# Patient Record
Sex: FEMALE | Race: WHITE | NOT HISPANIC OR LATINO | Employment: FULL TIME | URBAN - METROPOLITAN AREA
[De-identification: names, ages, dates, MRNs, and addresses within clinical notes are randomized per-mention and may not be internally consistent; named-entity substitution may affect disease eponyms.]

---

## 2017-01-09 ENCOUNTER — TELEPHONE (OUTPATIENT)
Dept: BARIATRICS/WEIGHT MGMT | Facility: CLINIC | Age: 37
End: 2017-01-09

## 2017-01-09 VITALS
WEIGHT: 235 LBS | SYSTOLIC BLOOD PRESSURE: 127 MMHG | BODY MASS INDEX: 35.61 KG/M2 | TEMPERATURE: 97.4 F | HEART RATE: 65 BPM | DIASTOLIC BLOOD PRESSURE: 84 MMHG | HEIGHT: 68 IN

## 2017-01-09 NOTE — TELEPHONE ENCOUNTER
Patient contacted and she states that she will get a copy of the CD of her most recent UGI at [facility]  and bring it in with her on her next appointment on 1/11/17 4:00 PM.

## 2017-01-09 NOTE — TELEPHONE ENCOUNTER
Patient called and states that she had lap band surgery 5 years ago by Dr. Campuzano and for the past week she has noticed pain and tenderness around her port. Patient complains of pain with her port when she moves, bends over or any type of movement. Patient denies any nausea, vomiting, or other symptoms. Does patient need appointment? Please advise further instructions, thank you.

## 2017-01-10 ENCOUNTER — OFFICE VISIT (OUTPATIENT)
Dept: BARIATRICS/WEIGHT MGMT | Facility: CLINIC | Age: 37
End: 2017-01-10

## 2017-01-10 VITALS
RESPIRATION RATE: 18 BRPM | TEMPERATURE: 98.2 F | HEIGHT: 68 IN | OXYGEN SATURATION: 98 % | WEIGHT: 227 LBS | BODY MASS INDEX: 34.4 KG/M2 | HEART RATE: 75 BPM | DIASTOLIC BLOOD PRESSURE: 72 MMHG | SYSTOLIC BLOOD PRESSURE: 115 MMHG

## 2017-01-10 DIAGNOSIS — R10.9 ABDOMINAL PAIN, UNSPECIFIED LOCATION: Primary | ICD-10-CM

## 2017-01-10 DIAGNOSIS — R10.9 ABDOMINAL PAIN, UNSPECIFIED LOCATION: ICD-10-CM

## 2017-01-10 DIAGNOSIS — E66.9 OBESITY, CLASS I, BMI 30-34.9: ICD-10-CM

## 2017-01-10 DIAGNOSIS — Z98.84 S/P BARIATRIC SURGERY: ICD-10-CM

## 2017-01-10 PROCEDURE — 99214 OFFICE O/P EST MOD 30 MIN: CPT | Performed by: PHYSICIAN ASSISTANT

## 2017-01-10 RX ORDER — LISINOPRIL 20 MG/1
20 TABLET ORAL DAILY
COMMUNITY
End: 2017-02-20

## 2017-01-10 RX ORDER — BUPROPION HYDROCHLORIDE 300 MG/1
300 TABLET ORAL DAILY
COMMUNITY
End: 2018-06-13

## 2017-01-10 RX ORDER — HYDROCHLOROTHIAZIDE 25 MG/1
25 TABLET ORAL DAILY
COMMUNITY
End: 2017-09-09 | Stop reason: HOSPADM

## 2017-01-10 NOTE — PROGRESS NOTES
"[facility] Bariatric Surgery  [address]  [facility] [address] [phone]        Patient Name:  Lindsey Sprague.  :  1980      Date of Visit: 1/10/2017    Bariatric Surgery Follow up visit    HPI:  Lindsey Sprague is a 36 y.o. female s/p LAGB APS w/ HHR 3/2011 by [facility].  Band was emptied (-6.9cc) 2015 for c/o dysphagia.  UGI following unfill WNL.  Unfortunately gained 34 lbs w/ the unfill and was very frustrated w/ herself.  LOV 2015 - added 0.2cc.  Current band vol 6.8cc.  Called yesterday c/o port pain/tenderness x 1 week, worse w/ ROM.  Denies dysphagia, reflux, nausea, vomiting.  Says she feels her band is not the issue.  Denies fevers or redness at the port site.  Does not think she can tolerate any type of adjustment today d/t the pain.       The following portions of the patient's history were reviewed and updated as appropriate: allergies, current medications, past medical history, past social history and past surgical history.    Past Medical History   Diagnosis Date   • Depression    • Fatigue    • GERD (gastroesophageal reflux disease)    • Hypertension    • Polycystic ovarian syndrome      Past Surgical History   Procedure Laterality Date   • Tonsillectomy     • Laparoscopic gastric banding       s/p LAGB APS w/ HHR 3/2011 by [facility]     Allergies no known allergies      Current Outpatient Prescriptions:   •  buPROPion XL (WELLBUTRIN XL) 300 MG 24 hr tablet, Take 300 mg by mouth Daily., Disp: , Rfl:   •  hydrochlorothiazide (HYDRODIURIL) 25 MG tablet, Take 25 mg by mouth Daily., Disp: , Rfl:   •  lisinopril (PRINIVIL,ZESTRIL) 20 MG tablet, Take 20 mg by mouth Daily., Disp: , Rfl:       Visit Vitals   • /72 (BP Location: Left arm, Patient Position: Sitting, Cuff Size: Large Adult)   • Pulse 75   • Temp 98.2 °F (36.8 °C) (Temporal Artery )   • Resp 18   • Ht 68\" (172.7 cm)   • Wt 227 lb (103 kg)  Comment: pre-op weight 266 lbs   • SpO2 98%   • " BMI 34.52 kg/m2       Physical Exam   Constitutional: She appears well-developed and well-nourished. She is cooperative.   obese   HENT:   Mouth/Throat: Oropharynx is clear and moist and mucous membranes are normal.   Eyes: Conjunctivae are normal. No scleral icterus.   Cardiovascular: Normal rate and regular rhythm.    Pulmonary/Chest: Effort normal and breath sounds normal.   Abdominal: Soft. Bowel sounds are normal.   LLQ port site - tender to palpation but o/w okay   Musculoskeletal: Normal range of motion. She exhibits no edema.   Neurological: She is alert.   Skin: Skin is warm and dry. No rash noted.   Psychiatric: She has a normal mood and affect. Judgment normal.     Procedure Note:  Band Adjustment    Band Type:   APS   Port Location:   LLQ   Procedure Position:   upright   Needle Type:  short    Local Anesthesia: with 1% lidocaine and bicarb       Amount Expected (cc):    6.8   Amount Added (cc):    Amount Removed (cc):     Total Amount (cc):           Able to andrea water after ADJ? Yes   Other Notes:        Assessment: s/p LAGB APS w/ HHR 3/2011 by [facility]    ICD-10-CM ICD-9-CM   1. Abdominal pain, unspecified location R10.9 789.00   2. Obesity, Class I, BMI 30-34.9 E66.9 278.00   3. S/P bariatric surgery Z98.84 V45.86       Plan:    • No adjustment today.  • UGI ordered to further evaluate.  • Discussed EGD to rule out band erosion.   Discussed possibility of band intolerance which would ultimately lead to band removal.  Patient expressed interest in pursuing a revision to LSG - packet given to patient.  Further input to follow pending results.        JEAN Miles

## 2017-01-12 ENCOUNTER — TELEPHONE (OUTPATIENT)
Dept: BARIATRICS/WEIGHT MGMT | Facility: CLINIC | Age: 37
End: 2017-01-12

## 2017-01-12 ENCOUNTER — RESULTS ENCOUNTER (OUTPATIENT)
Dept: BARIATRICS/WEIGHT MGMT | Facility: CLINIC | Age: 37
End: 2017-01-12

## 2017-01-12 DIAGNOSIS — R10.9 ABDOMINAL PAIN, UNSPECIFIED LOCATION: Primary | ICD-10-CM

## 2017-01-12 DIAGNOSIS — R10.9 ABDOMINAL PAIN, UNSPECIFIED LOCATION: ICD-10-CM

## 2017-01-12 NOTE — TELEPHONE ENCOUNTER
Patient informed that she can get a copy of the UGI images on a CD and bring them today or have her  bring them tomorrow, whichever she prefers. Patient verbalized understanding.

## 2017-01-12 NOTE — TELEPHONE ENCOUNTER
Patient contacted and states that she had her blood drawn this morning with the procedure. Patient wants to know if you want her to bring you the results today. Patient states she spoke with you last night.

## 2017-01-13 ENCOUNTER — OFFICE VISIT (OUTPATIENT)
Dept: BARIATRICS/WEIGHT MGMT | Facility: CLINIC | Age: 37
End: 2017-01-13

## 2017-01-13 ENCOUNTER — TELEPHONE (OUTPATIENT)
Dept: BARIATRICS/WEIGHT MGMT | Facility: CLINIC | Age: 37
End: 2017-01-13

## 2017-01-13 VITALS
DIASTOLIC BLOOD PRESSURE: 81 MMHG | HEIGHT: 68 IN | SYSTOLIC BLOOD PRESSURE: 128 MMHG | WEIGHT: 230.55 LBS | HEART RATE: 72 BPM | BODY MASS INDEX: 34.94 KG/M2 | TEMPERATURE: 98.7 F

## 2017-01-13 DIAGNOSIS — R11.0 NAUSEA: ICD-10-CM

## 2017-01-13 DIAGNOSIS — Z98.84 S/P BARIATRIC SURGERY: ICD-10-CM

## 2017-01-13 DIAGNOSIS — E66.9 OBESITY, CLASS II, BMI 35-39.9: ICD-10-CM

## 2017-01-13 DIAGNOSIS — R10.9 ABDOMINAL PAIN, UNSPECIFIED LOCATION: Primary | ICD-10-CM

## 2017-01-13 PROCEDURE — S2083 ADJUSTMENT GASTRIC BAND: HCPCS | Performed by: PHYSICIAN ASSISTANT

## 2017-01-13 PROCEDURE — 99214 OFFICE O/P EST MOD 30 MIN: CPT | Performed by: PHYSICIAN ASSISTANT

## 2017-01-13 NOTE — TELEPHONE ENCOUNTER
"Patient called and complains of worsening nausea and a \"burning\" sensation in her stomach. Patient states that her port site is still swollen and sore. Patient denies any other symptoms. Patient wants to know what further instructions you have for her and if you have reviewed her results? Thank you  "

## 2017-01-13 NOTE — TELEPHONE ENCOUNTER
I called the patient and she states that she will come in today, patient is on the way and will be here at 3:45PM. Patient wants to discuss having the band removal at her appointment.

## 2017-01-13 NOTE — PROGRESS NOTES
[facility] Bariatric Surgery  [address]  [facility] [address]  [phone]        Patient Name:  Lindsey Sprague.  :  1980      Date of Visit: 2017    Bariatric Surgery Follow up visit    HPI:  Lindsey Sprague is a 36 y.o. female s/p LAGB APS w/ HHR 3/2011 by [facility].  Presented 1/10/17 with c/o port pain/tenderness x 1 week, worse w/ ROM.  Denied dysphagia, reflux, nausea, vomiting.  At that time felt her band was not the issue, and she said she did not think she could tolerate an unfill.  Denied fevers or redness at the port site.  No known trauma to the area.      Labs and UGI ordered for further evaluation.  CBC/CMP 17 WNL.  UGI @[facility] 17 showed mild pouch enlargement, but was otherwise unremarkable.      She returns to the office today in tears b/c her pain is worsening.  Now c/o associated nausea and burning epigastric pain, as well as persistent/worsening port pain.  Still no fevers/chills or redness at the port site.  Agrees to band unfill and says she wants to pursue band removal.    The following portions of the patient's history were reviewed and updated as appropriate: allergies, current medications, past medical history, past social history and past surgical history.    Past Medical History   Diagnosis Date   • Depression    • Fatigue    • GERD (gastroesophageal reflux disease)    • Hypertension    • Polycystic ovarian syndrome      Past Surgical History   Procedure Laterality Date   • Tonsillectomy     • Laparoscopic gastric banding       s/p LAGB APS w/ HHR 3/2011 by [facility]     No Known Allergies      Current Outpatient Prescriptions:   •  buPROPion XL (WELLBUTRIN XL) 300 MG 24 hr tablet, Take 300 mg by mouth Daily., Disp: , Rfl:   •  hydrochlorothiazide (HYDRODIURIL) 25 MG tablet, Take 25 mg by mouth Daily., Disp: , Rfl:   •  lisinopril (PRINIVIL,ZESTRIL) 20 MG tablet, Take 20 mg by mouth Daily., Disp: , Rfl:      Social Hx:  ", Lives in [place], Nonsmoker.      Visit Vitals   • /81 (BP Location: Left arm, Patient Position: Sitting)   • Pulse 72   • Temp 98.7 °F (37.1 °C)   • Ht 68\" (172.7 cm)   • Wt 230 lb 8.8 oz (105 kg)   • BMI 35.06 kg/m2       Physical Exam   Constitutional: She appears well-developed and well-nourished. She is cooperative.   appears distressed and uncomfortable   HENT:   Mouth/Throat: Oropharynx is clear and moist and mucous membranes are normal.   Eyes: Conjunctivae are normal. No scleral icterus.   Cardiovascular: Normal rate and regular rhythm.    Pulmonary/Chest: Effort normal and breath sounds normal.   Abdominal: Soft. Bowel sounds are normal.   LLQ port site - tender to palpation but w/out warmth/erythema   Musculoskeletal: Normal range of motion. She exhibits no edema.   Neurological: She is alert.   Skin: Skin is warm and dry. No rash noted. There is pallor.   Psychiatric: She has a normal mood and affect. Judgment normal.     Procedure Note:  Band Adjustment    Band Type:   APS   Port Location:   LLQ   Procedure Position:   upright   Needle Type:  short    Local Anesthesia: with 1% lidocaine and bicarb       Amount Expected (cc):    6.8   Amount Added (cc):    Amount Removed (cc):  -6.8cc (clear fluid)   Total Amount (cc):    0.0       Able to andrea water after ADJ? Yes   Other Notes:        Assessment: s/p LAGB APS w/ HHR 3/2011 by [facility]    ICD-10-CM ICD-9-CM   1. Abdominal pain, unspecified location R10.9 789.00   2. Nausea R11.0 787.02   3. Obesity, Class II, BMI 35-39.9 E66.01 278.01   4. S/P bariatric surgery Z98.84 V45.86       Plan:  Band unfilled.  Advised warm liquids w/ gradual diet progression only as tolerated.   Plan EGD for further eval.  Instructed to go to ER if pain acutely worsens.  Patient verbalizes understanding.          JEAN Miles  "

## 2017-01-23 PROCEDURE — 88305 TISSUE EXAM BY PATHOLOGIST: CPT | Performed by: SURGERY

## 2017-01-24 ENCOUNTER — TELEPHONE (OUTPATIENT)
Dept: BARIATRICS/WEIGHT MGMT | Facility: CLINIC | Age: 37
End: 2017-01-24

## 2017-01-24 ENCOUNTER — LAB REQUISITION (OUTPATIENT)
Dept: LAB | Facility: HOSPITAL | Age: 37
End: 2017-01-24

## 2017-01-24 DIAGNOSIS — R10.11 RIGHT UPPER QUADRANT PAIN: ICD-10-CM

## 2017-01-24 NOTE — TELEPHONE ENCOUNTER
"Patient called and states that she had her EGD completed yesterday. Patient is wanting to know the next step in the process about getting her band removed because she is still in a lot of pain. Patient states that Dr. Campuzano told her that she is a good candidate for the Sleeve surgery and she wants to get the \"ball rolling\" on this. Patient is also wanting to know the result to her EGD biopsy.   "

## 2017-01-25 ENCOUNTER — PREP FOR SURGERY (OUTPATIENT)
Dept: BARIATRICS/WEIGHT MGMT | Facility: CLINIC | Age: 37
End: 2017-01-25

## 2017-01-25 DIAGNOSIS — R10.9 ABDOMINAL PAIN, UNSPECIFIED LOCATION: Primary | ICD-10-CM

## 2017-01-25 LAB
CYTO UR: NORMAL
LAB AP CASE REPORT: NORMAL
LAB AP CLINICAL INFORMATION: NORMAL
Lab: NORMAL
PATH REPORT.FINAL DX SPEC: NORMAL
PATH REPORT.GROSS SPEC: NORMAL

## 2017-01-25 NOTE — TELEPHONE ENCOUNTER
Patient contacted and informed that the order has been placed for band removal and awaiting for insurance approval. Patient states that she has submitted the revision packet.

## 2017-01-31 DIAGNOSIS — R53.83 FATIGUE, UNSPECIFIED TYPE: Primary | ICD-10-CM

## 2017-01-31 DIAGNOSIS — R06.00 DYSPNEA, UNSPECIFIED TYPE: ICD-10-CM

## 2017-01-31 NOTE — H&P
[facility] Bariatric Surgery  [address]  [facility] [address]  [phone]           Patient Name: Lindsey Sprague.  : 1980    CC:  Chronic abd.pain, s/p lapband     HPI: Lindsey Sprague is a 36 y.o. female s/p LAGB APS w/ HHR 3/2011 by [facility]. Presented 1/10/17 with c/o port pain/tenderness x 1 week, worse w/ ROM. Denied dysphagia, reflux, nausea, vomiting. At that time felt her band was not the issue, and she said she did not think she could tolerate an unfill. Denied fevers or redness at the port site. No known trauma to the area.      Labs and UGI ordered for further evaluation. CBC/CMP 17 WNL. [facility] 17 showed mild pouch enlargement, but was otherwise unremarkable.      Returned to office 17 for band unfill.  Band emptied (-6.8cc clear fluid removed).  Sx unfortunately persisted.   EGD 17 w/ Dr. Campuzano revealed moderate pouch enlargment but no slip or band erosion.    She continues to c/o nausea and burning epigastric pain, as well as persistent/worsening port pain. Still no fevers/chills or redness at the port site.  Wishes to pursue band removal at this time.     Medical History         Past Medical History   Diagnosis Date   • Depression     • Fatigue     • GERD (gastroesophageal reflux disease)     • Hypertension     • Polycystic ovarian syndrome            Surgical History           Past Surgical History   Procedure Laterality Date   • Tonsillectomy      • Laparoscopic gastric banding          s/p LAGB APS w/ HHR 3/2011 by [facility]         No Known Allergies        Current Outpatient Prescriptions:   • buPROPion XL (WELLBUTRIN XL) 300 MG 24 hr tablet, Take 300 mg by mouth Daily., Disp: , Rfl:   • hydrochlorothiazide (HYDRODIURIL) 25 MG tablet, Take 25 mg by mouth Daily., Disp: , Rfl:   • lisinopril (PRINIVIL,ZESTRIL) 20 MG tablet, Take 20 mg by mouth Daily., Disp: , Rfl:       Social Hx: , Lives in [place],  "Nonsmoker.             Visit Vitals   • /81 (BP Location: Left arm, Patient Position: Sitting)   • Pulse 72   • Temp 98.7 °F (37.1 °C)   • Ht 68\" (172.7 cm)   • Wt 230 lb 8.8 oz (105 kg)   • BMI 35.06 kg/m2         Physical Exam   Constitutional: She appears well-developed and well-nourished. She is cooperative.   appears distressed and uncomfortable   HENT:   Mouth/Throat: Oropharynx is clear and moist and mucous membranes are normal.   Eyes: Conjunctivae are normal. No scleral icterus.   Cardiovascular: Normal rate and regular rhythm.   Pulmonary/Chest: Effort normal and breath sounds normal.   Abdominal: Soft. Bowel sounds are normal.   LLQ port site - tender to palpation but w/out warmth/erythema   Musculoskeletal: Normal range of motion. She exhibits no edema.   Neurological: She is alert.   Skin: Skin is warm and dry. No rash noted. There is pallor.   Psychiatric: She has a normal mood and affect. Judgment normal.            Band Adjustment    Band Type: APS   Port Location: LLQ   Procedure Position: upright   Needle Type: short    Local Anesthesia: with 1% lidocaine and bicarb         Amount Expected (cc): 0.0   Amount Added (cc):     Amount Removed (cc):    Total Amount (cc): 0.0         Able to andrea water after ADJ? Yes   Other Notes:           Assessment: s/p LAGB APS w/ HHR 3/2011 by [facility]      ICD-10-CM ICD-9-CM   1. Abdominal pain, unspecified location R10.9 789.00   2. Nausea R11.0 787.02   3. Obesity, Class II, BMI 35-39.9 E66.01 278.01   4. S/P bariatric surgery Z98.84 V45.86         Plan:   Laparoscopic Lapband Removal.        "

## 2017-02-01 DIAGNOSIS — R06.00 DYSPNEA, UNSPECIFIED TYPE: ICD-10-CM

## 2017-02-01 DIAGNOSIS — R53.83 FATIGUE, UNSPECIFIED TYPE: ICD-10-CM

## 2017-02-06 ENCOUNTER — LAB REQUISITION (OUTPATIENT)
Dept: LAB | Facility: HOSPITAL | Age: 37
End: 2017-02-06

## 2017-02-06 ENCOUNTER — OUTSIDE FACILITY SERVICE (OUTPATIENT)
Dept: BARIATRICS/WEIGHT MGMT | Facility: CLINIC | Age: 37
End: 2017-02-06

## 2017-02-06 DIAGNOSIS — R10.9 ABDOMINAL PAIN: ICD-10-CM

## 2017-02-06 LAB
LAB AP CASE REPORT: NORMAL
LAB AP CLINICAL INFORMATION: NORMAL
Lab: NORMAL
PATH REPORT.FINAL DX SPEC: NORMAL
PATH REPORT.GROSS SPEC: NORMAL

## 2017-02-06 PROCEDURE — 43774 LAP RMVL GASTR ADJ ALL PARTS: CPT | Performed by: SURGERY

## 2017-02-06 PROCEDURE — 88300 SURGICAL PATH GROSS: CPT | Performed by: SURGERY

## 2017-02-07 ENCOUNTER — TELEPHONE (OUTPATIENT)
Dept: BARIATRICS/WEIGHT MGMT | Facility: CLINIC | Age: 37
End: 2017-02-07

## 2017-02-07 RX ORDER — ONDANSETRON 4 MG/1
4 TABLET, FILM COATED ORAL EVERY 6 HOURS PRN
Qty: 20 TABLET | Refills: 0 | Status: SHIPPED | OUTPATIENT
Start: 2017-02-07 | End: 2017-05-18

## 2017-02-07 NOTE — TELEPHONE ENCOUNTER
Patient called requesting RX for Zofran, patient complains the pain medication is making her sick to her stomach but she states she has to take the pain medication. Patient also states that she has a follow up appointment on 2/20/17 and she wants to know if she can schedule her follow up and her revision appointment on the same day?

## 2017-02-07 NOTE — TELEPHONE ENCOUNTER
Patient contacted and informed that RX Zofran was sent to the pharmacy. I also informed patient that she would have a revision/intake appointment on a later date.

## 2017-02-20 ENCOUNTER — OFFICE VISIT (OUTPATIENT)
Dept: BARIATRICS/WEIGHT MGMT | Facility: CLINIC | Age: 37
End: 2017-02-20

## 2017-02-20 VITALS
DIASTOLIC BLOOD PRESSURE: 88 MMHG | HEART RATE: 87 BPM | WEIGHT: 245 LBS | SYSTOLIC BLOOD PRESSURE: 128 MMHG | HEIGHT: 68 IN | OXYGEN SATURATION: 99 % | RESPIRATION RATE: 18 BRPM | TEMPERATURE: 98.1 F | BODY MASS INDEX: 37.13 KG/M2

## 2017-02-20 DIAGNOSIS — Z98.890 STATUS POST SURGERY: Primary | ICD-10-CM

## 2017-02-20 DIAGNOSIS — R53.83 OTHER FATIGUE: ICD-10-CM

## 2017-02-20 PROCEDURE — 99024 POSTOP FOLLOW-UP VISIT: CPT | Performed by: PHYSICIAN ASSISTANT

## 2017-02-20 RX ORDER — FLUOXETINE HYDROCHLORIDE 40 MG/1
40 CAPSULE ORAL DAILY
COMMUNITY
Start: 2017-01-19 | End: 2018-03-16

## 2017-02-20 RX ORDER — LOSARTAN POTASSIUM 25 MG/1
25 TABLET ORAL DAILY
COMMUNITY
Start: 2017-02-18 | End: 2018-03-16

## 2017-02-20 NOTE — PROGRESS NOTES
[facility] BARIATRIC SURGERY  [address]  [facility] [address]  [phone]    Lindsey Sprague.  1980    Day Of Visit: 2/20/17    Reason for Visit:  Band removal Follow Up    HPI:   Lindsey Sprague is a 36 y.o. female s/p LAGB APS w/ HHR 3/2011 followed by [facility] by Dr. Campuzano  on 2/6/17 for port pain, pouch enlargement and nausea/epigastric pain. Doing well now. No further issues. Tolerating PO w/out issue. Denies fever, nausea, vomiting and abdominal pain. Bowels are moving. Voiding well. No other issues/concerns.     Past Medical History   Diagnosis Date   • Depression    • Fatigue    • GERD (gastroesophageal reflux disease)    • Heartburn    • Hypertension    • Menstrual irregularity    • Polycystic ovarian syndrome      Past Surgical History   Procedure Laterality Date   • Laparoscopic gastric banding  2011     s/p LAGB APS w/ HHR 3/2011 by [facility]   • Tonsillectomy  1994       Current Outpatient Prescriptions:   •  buPROPion XL (WELLBUTRIN XL) 300 MG 24 hr tablet, Take 300 mg by mouth Daily., Disp: , Rfl:   •  hydrochlorothiazide (HYDRODIURIL) 25 MG tablet, Take 25 mg by mouth Daily., Disp: , Rfl:   •  lisinopril (PRINIVIL,ZESTRIL) 20 MG tablet, Take 20 mg by mouth Daily., Disp: , Rfl:   •  ondansetron (ZOFRAN) 4 MG tablet, Take 1 tablet by mouth Every 6 (Six) Hours As Needed for nausea., Disp: 20 tablet, Rfl: 0  No Known Allergies  Social History     Social History   • Marital status:      Spouse name: N/A   • Number of children: N/A   • Years of education: N/A     Occupational History   • Not on file.     Social History Main Topics   • Smoking status: Never Smoker   • Smokeless tobacco: Not on file   • Alcohol use No   • Drug use: Not on file   • Sexual activity: Not on file     Other Topics Concern   • Not on file     Social History Narrative     Visit Vitals   • /88 (BP Location: Left arm, Patient Position: Sitting, Cuff Size: Large Adult)   • Pulse 87  "  • Temp 98.1 °F (36.7 °C) (Temporal Artery )   • Resp 18   • Ht 67.5\" (171.5 cm)   • Wt 245 lb (111 kg)   • SpO2 99%   • BMI 37.81 kg/m2     General Appearance:  Well nourished.  In no acute distress.  Patient was observed to be obese.  Oral Cavity:   Buccal Mucosa: The buccal mucosa was moist.  Lungs:  Normal breath sounds/voice sounds.  Cardiovascular:   Heart Rate And Rhythm: Heart rate and rhythm normal.   Edema: No calf tenderness.  Abdomen:  Abdomen: incisions healing well.   Auscultation: The bowel sounds were normal.   Palpation: No mass was palpated in the abdomen, Soft, nontender, and nondistended.   Hernia: No hernia was discovered.  Musculoskeletal System:   General/bilateral: No edema present in extremities.  Normal movement of all extremities.  Neurological:  Was alert and oriented.   Gait And Stance: Gait and stance were normal.  Psychiatric:   Attitude: The attitude was cooperative.   Mood: Mood pleasant.  Skin:  The complexion was normal.  The skin moisture was normal and the skin temperature was normal.    Assessment:   2 weeks s/p AGBR;  the patient is doing well.       Plan:   Call w/issues and concerns  Follow up 3 months for sleeve consult.  RTC sooner with problems.    JEAN Camacho    "

## 2017-05-04 ENCOUNTER — DOCUMENTATION (OUTPATIENT)
Dept: BARIATRICS/WEIGHT MGMT | Facility: CLINIC | Age: 37
End: 2017-05-04

## 2017-05-04 DIAGNOSIS — R53.83 FATIGUE, UNSPECIFIED TYPE: Primary | ICD-10-CM

## 2017-05-04 DIAGNOSIS — R06.00 DYSPNEA, UNSPECIFIED TYPE: ICD-10-CM

## 2017-05-04 DIAGNOSIS — R10.13 DYSPEPSIA: ICD-10-CM

## 2017-05-17 DIAGNOSIS — R53.83 FATIGUE, UNSPECIFIED TYPE: ICD-10-CM

## 2017-05-17 DIAGNOSIS — R06.00 DYSPNEA, UNSPECIFIED TYPE: ICD-10-CM

## 2017-05-17 DIAGNOSIS — R10.13 DYSPEPSIA: ICD-10-CM

## 2017-05-18 ENCOUNTER — DOCUMENTATION (OUTPATIENT)
Dept: BARIATRICS/WEIGHT MGMT | Facility: HOSPITAL | Age: 37
End: 2017-05-18

## 2017-05-18 ENCOUNTER — OFFICE VISIT (OUTPATIENT)
Dept: BARIATRICS/WEIGHT MGMT | Facility: CLINIC | Age: 37
End: 2017-05-18

## 2017-05-18 VITALS
HEART RATE: 88 BPM | RESPIRATION RATE: 18 BRPM | BODY MASS INDEX: 40.01 KG/M2 | DIASTOLIC BLOOD PRESSURE: 68 MMHG | WEIGHT: 264 LBS | SYSTOLIC BLOOD PRESSURE: 108 MMHG | TEMPERATURE: 97.8 F | HEIGHT: 68 IN | OXYGEN SATURATION: 99 %

## 2017-05-18 DIAGNOSIS — F32.A DEPRESSION, UNSPECIFIED DEPRESSION TYPE: ICD-10-CM

## 2017-05-18 DIAGNOSIS — R53.83 OTHER FATIGUE: Primary | ICD-10-CM

## 2017-05-18 DIAGNOSIS — E66.01 MORBID OBESITY, UNSPECIFIED OBESITY TYPE (HCC): ICD-10-CM

## 2017-05-18 DIAGNOSIS — I10 ESSENTIAL HYPERTENSION: ICD-10-CM

## 2017-05-18 PROCEDURE — 99215 OFFICE O/P EST HI 40 MIN: CPT | Performed by: PHYSICIAN ASSISTANT

## 2017-05-18 RX ORDER — TRAZODONE HYDROCHLORIDE 100 MG/1
100 TABLET ORAL NIGHTLY
COMMUNITY
Start: 2017-05-15

## 2017-05-18 RX ORDER — BUSPIRONE HYDROCHLORIDE 10 MG/1
10 TABLET ORAL AS NEEDED
COMMUNITY
Start: 2017-03-22 | End: 2018-06-13

## 2017-05-22 LAB
ALBUMIN SERPL-MCNC: 4.2 G/DL (ref 3.2–4.8)
ALBUMIN/GLOB SERPL: 1.4 G/DL (ref 1.5–2.5)
ALP SERPL-CCNC: 63 U/L (ref 25–100)
ALT SERPL-CCNC: 27 U/L (ref 7–40)
AST SERPL-CCNC: 25 U/L (ref 0–33)
BILIRUB SERPL-MCNC: 0.4 MG/DL (ref 0.3–1.2)
BUN SERPL-MCNC: 15 MG/DL (ref 9–23)
BUN/CREAT SERPL: 16.7 (ref 7–25)
CALCIUM SERPL-MCNC: 9.7 MG/DL (ref 8.7–10.4)
CHLORIDE SERPL-SCNC: 103 MMOL/L (ref 99–109)
CHOLEST SERPL-MCNC: 192 MG/DL (ref 0–200)
CO2 SERPL-SCNC: 30 MMOL/L (ref 20–31)
CREAT SERPL-MCNC: 0.9 MG/DL (ref 0.6–1.3)
ERYTHROCYTE [DISTWIDTH] IN BLOOD BY AUTOMATED COUNT: 13.5 % (ref 11.3–14.5)
GLOBULIN SER CALC-MCNC: 3 GM/DL
GLUCOSE SERPL-MCNC: 101 MG/DL (ref 70–100)
H PYLORI IGA SER-ACNC: <9 UNITS (ref 0–8.9)
H PYLORI IGG SER IA-ACNC: <0.9 U/ML (ref 0–0.8)
H PYLORI IGM SER-ACNC: <9 UNITS (ref 0–8.9)
HCT VFR BLD AUTO: 45 % (ref 34.5–44)
HDLC SERPL-MCNC: 81 MG/DL (ref 40–60)
HGB BLD-MCNC: 14.7 G/DL (ref 11.5–15.5)
LDLC SERPL CALC-MCNC: 102 MG/DL (ref 0–100)
MCH RBC QN AUTO: 27.3 PG (ref 27–31)
MCHC RBC AUTO-ENTMCNC: 32.7 G/DL (ref 32–36)
MCV RBC AUTO: 83.5 FL (ref 80–99)
PLATELET # BLD AUTO: 289 10*3/MM3 (ref 150–450)
POTASSIUM SERPL-SCNC: 3.7 MMOL/L (ref 3.5–5.5)
PROT SERPL-MCNC: 7.2 G/DL (ref 5.7–8.2)
RBC # BLD AUTO: 5.39 10*6/MM3 (ref 3.89–5.14)
SODIUM SERPL-SCNC: 138 MMOL/L (ref 132–146)
TRIGL SERPL-MCNC: 44 MG/DL (ref 0–150)
TSH SERPL DL<=0.005 MIU/L-ACNC: 1.69 MIU/ML (ref 0.35–5.35)
VLDLC SERPL CALC-MCNC: 8.8 MG/DL
WBC # BLD AUTO: 6.82 10*3/MM3 (ref 3.5–10.8)

## 2017-08-14 DIAGNOSIS — R53.83 FATIGUE, UNSPECIFIED TYPE: ICD-10-CM

## 2017-08-14 DIAGNOSIS — R06.00 DYSPNEA, UNSPECIFIED TYPE: Primary | ICD-10-CM

## 2017-08-14 DIAGNOSIS — R06.00 DYSPNEA, UNSPECIFIED TYPE: ICD-10-CM

## 2017-08-22 ENCOUNTER — CONSULT (OUTPATIENT)
Dept: BARIATRICS/WEIGHT MGMT | Facility: CLINIC | Age: 37
End: 2017-08-22

## 2017-08-22 VITALS
BODY MASS INDEX: 41.6 KG/M2 | OXYGEN SATURATION: 99 % | WEIGHT: 274.5 LBS | RESPIRATION RATE: 16 BRPM | HEIGHT: 68 IN | SYSTOLIC BLOOD PRESSURE: 125 MMHG | TEMPERATURE: 97.5 F | DIASTOLIC BLOOD PRESSURE: 80 MMHG | HEART RATE: 77 BPM

## 2017-08-22 DIAGNOSIS — E66.01 OBESITY, CLASS III, BMI 40-49.9 (MORBID OBESITY) (HCC): Primary | ICD-10-CM

## 2017-08-22 PROCEDURE — 99407 BEHAV CHNG SMOKING > 10 MIN: CPT | Performed by: SURGERY

## 2017-08-22 PROCEDURE — 99214 OFFICE O/P EST MOD 30 MIN: CPT | Performed by: SURGERY

## 2017-08-22 RX ORDER — CHLORHEXIDINE GLUCONATE 0.12 MG/ML
15 RINSE ORAL ONCE
Status: CANCELLED | OUTPATIENT
Start: 2017-08-22

## 2017-08-22 RX ORDER — SODIUM CHLORIDE, SODIUM LACTATE, POTASSIUM CHLORIDE, CALCIUM CHLORIDE 600; 310; 30; 20 MG/100ML; MG/100ML; MG/100ML; MG/100ML
150 INJECTION, SOLUTION INTRAVENOUS CONTINUOUS
Status: CANCELLED | OUTPATIENT
Start: 2017-08-22

## 2017-08-22 RX ORDER — SCOLOPAMINE TRANSDERMAL SYSTEM 1 MG/1
1 PATCH, EXTENDED RELEASE TRANSDERMAL ONCE
Status: CANCELLED | OUTPATIENT
Start: 2017-08-22 | End: 2017-08-22

## 2017-08-22 RX ORDER — SODIUM CHLORIDE 0.9 % (FLUSH) 0.9 %
1-10 SYRINGE (ML) INJECTION AS NEEDED
Status: CANCELLED | OUTPATIENT
Start: 2017-08-22

## 2017-08-22 RX ORDER — PANTOPRAZOLE SODIUM 40 MG/10ML
40 INJECTION, POWDER, LYOPHILIZED, FOR SOLUTION INTRAVENOUS ONCE
Status: CANCELLED | OUTPATIENT
Start: 2017-08-22 | End: 2017-08-22

## 2017-08-23 PROBLEM — E66.01 OBESITY, CLASS III, BMI 40-49.9 (MORBID OBESITY) (HCC): Status: ACTIVE | Noted: 2017-08-23

## 2017-08-30 ENCOUNTER — APPOINTMENT (OUTPATIENT)
Dept: PREADMISSION TESTING | Facility: HOSPITAL | Age: 37
End: 2017-08-30

## 2017-08-30 LAB
DEPRECATED RDW RBC AUTO: 40.6 FL (ref 37–54)
ERYTHROCYTE [DISTWIDTH] IN BLOOD BY AUTOMATED COUNT: 13.5 % (ref 11.3–14.5)
HBA1C MFR BLD: 5.1 % (ref 4.8–5.6)
HCT VFR BLD AUTO: 42.3 % (ref 34.5–44)
HGB BLD-MCNC: 14.4 G/DL (ref 11.5–15.5)
MCH RBC QN AUTO: 27.9 PG (ref 27–31)
MCHC RBC AUTO-ENTMCNC: 34 G/DL (ref 32–36)
MCV RBC AUTO: 82 FL (ref 80–99)
PLATELET # BLD AUTO: 241 10*3/MM3 (ref 150–450)
PMV BLD AUTO: 9.1 FL (ref 6–12)
POTASSIUM BLD-SCNC: 3.4 MMOL/L (ref 3.5–5.5)
RBC # BLD AUTO: 5.16 10*6/MM3 (ref 3.89–5.14)
WBC NRBC COR # BLD: 10.37 10*3/MM3 (ref 3.5–10.8)

## 2017-08-30 PROCEDURE — 85027 COMPLETE CBC AUTOMATED: CPT | Performed by: ANESTHESIOLOGY

## 2017-08-30 PROCEDURE — 36415 COLL VENOUS BLD VENIPUNCTURE: CPT

## 2017-08-30 PROCEDURE — 83036 HEMOGLOBIN GLYCOSYLATED A1C: CPT | Performed by: ANESTHESIOLOGY

## 2017-08-30 PROCEDURE — 84132 ASSAY OF SERUM POTASSIUM: CPT | Performed by: ANESTHESIOLOGY

## 2017-09-08 ENCOUNTER — HOSPITAL ENCOUNTER (INPATIENT)
Facility: HOSPITAL | Age: 37
LOS: 1 days | Discharge: HOME | End: 2017-09-09
Attending: SURGERY | Admitting: SURGERY

## 2017-09-08 ENCOUNTER — ANESTHESIA EVENT (OUTPATIENT)
Dept: PERIOP | Facility: HOSPITAL | Age: 37
End: 2017-09-08

## 2017-09-08 ENCOUNTER — ANESTHESIA (OUTPATIENT)
Dept: PERIOP | Facility: HOSPITAL | Age: 37
End: 2017-09-08

## 2017-09-08 DIAGNOSIS — E66.01 OBESITY, CLASS III, BMI 40-49.9 (MORBID OBESITY) (HCC): ICD-10-CM

## 2017-09-08 LAB
B-HCG UR QL: NEGATIVE
INTERNAL NEGATIVE CONTROL: NORMAL
INTERNAL POSITIVE CONTROL: REACTIVE
Lab: NORMAL
POTASSIUM BLDA-SCNC: 4.78 MMOL/L (ref 3.5–5.3)

## 2017-09-08 PROCEDURE — 25010000002 MORPHINE PER 10 MG: Performed by: SURGERY

## 2017-09-08 PROCEDURE — 25010000002 DEXAMETHASONE PER 1 MG: Performed by: NURSE ANESTHETIST, CERTIFIED REGISTERED

## 2017-09-08 PROCEDURE — 25010000002 ENOXAPARIN PER 10 MG: Performed by: SURGERY

## 2017-09-08 PROCEDURE — 0DJ08ZZ INSPECTION OF UPPER INTESTINAL TRACT, VIA NATURAL OR ARTIFICIAL OPENING ENDOSCOPIC: ICD-10-PCS | Performed by: SURGERY

## 2017-09-08 PROCEDURE — 25010000002 PROPOFOL 1000 MG/ML EMULSION: Performed by: NURSE ANESTHETIST, CERTIFIED REGISTERED

## 2017-09-08 PROCEDURE — 84132 ASSAY OF SERUM POTASSIUM: CPT | Performed by: SURGERY

## 2017-09-08 PROCEDURE — 94799 UNLISTED PULMONARY SVC/PX: CPT

## 2017-09-08 PROCEDURE — 43775 LAP SLEEVE GASTRECTOMY: CPT | Performed by: SURGERY

## 2017-09-08 PROCEDURE — 88307 TISSUE EXAM BY PATHOLOGIST: CPT | Performed by: SURGERY

## 2017-09-08 PROCEDURE — 25010000002 HYDROMORPHONE PER 4 MG: Performed by: NURSE ANESTHETIST, CERTIFIED REGISTERED

## 2017-09-08 PROCEDURE — 25010000003 CEFAZOLIN IN DEXTROSE 2-4 GM/100ML-% SOLUTION: Performed by: SURGERY

## 2017-09-08 PROCEDURE — 25010000002 FENTANYL CITRATE (PF) 100 MCG/2ML SOLUTION: Performed by: NURSE ANESTHETIST, CERTIFIED REGISTERED

## 2017-09-08 PROCEDURE — 25010000002 ONDANSETRON PER 1 MG: Performed by: NURSE ANESTHETIST, CERTIFIED REGISTERED

## 2017-09-08 PROCEDURE — 25010000002 NEOSTIGMINE PER 0.5 MG: Performed by: NURSE ANESTHETIST, CERTIFIED REGISTERED

## 2017-09-08 PROCEDURE — 25010000002 PROPOFOL 10 MG/ML EMULSION: Performed by: NURSE ANESTHETIST, CERTIFIED REGISTERED

## 2017-09-08 PROCEDURE — 25010000002 PROMETHAZINE PER 50 MG: Performed by: SURGERY

## 2017-09-08 PROCEDURE — 0DB64Z3 EXCISION OF STOMACH, PERCUTANEOUS ENDOSCOPIC APPROACH, VERTICAL: ICD-10-PCS | Performed by: SURGERY

## 2017-09-08 DEVICE — SEALANT WND FIBRIN TISSEEL VAPOR/HEAT/PREFIL/SYR 10ML: Type: IMPLANTABLE DEVICE | Site: STOMACH | Status: FUNCTIONAL

## 2017-09-08 DEVICE — PERI-STRIPS DRY WITH VERITAS COLLAGEN MATRIX (PSD-V) IS PREPARED FROM DEHYDRATED BOVINE PERICARDIUM PROCURED FROM CATTLE UNDER 30 MONTHS OF AGE IN THE UNITED STATES. ONE (1) TUBE OF PSD GEL (GEL) IS PROVIDED FOR EVERY TWO (2) POUCHES OF PSD-V. THE GEL IS USED TO CREATE A TEMPORARY BOND BETWEEN THE PSD-V BUTTRESS AND THE SURGICAL STAPLER JAWS UNTIL THE STAPLER IS POSITIONED AND FIRED.
Type: IMPLANTABLE DEVICE | Site: STOMACH | Status: FUNCTIONAL
Brand: PERI-STRIPS DRY WITH VERITAS COLLAGEN MATRIX

## 2017-09-08 RX ORDER — MORPHINE SULFATE 2 MG/ML
6 INJECTION, SOLUTION INTRAMUSCULAR; INTRAVENOUS
Status: DISCONTINUED | OUTPATIENT
Start: 2017-09-08 | End: 2017-09-09 | Stop reason: HOSPADM

## 2017-09-08 RX ORDER — SODIUM CHLORIDE, SODIUM LACTATE, POTASSIUM CHLORIDE, CALCIUM CHLORIDE 600; 310; 30; 20 MG/100ML; MG/100ML; MG/100ML; MG/100ML
9 INJECTION, SOLUTION INTRAVENOUS CONTINUOUS PRN
Status: DISCONTINUED | OUTPATIENT
Start: 2017-09-08 | End: 2017-09-08 | Stop reason: HOSPADM

## 2017-09-08 RX ORDER — CYANOCOBALAMIN 1000 UG/ML
1000 INJECTION, SOLUTION INTRAMUSCULAR; SUBCUTANEOUS ONCE
Status: COMPLETED | OUTPATIENT
Start: 2017-09-09 | End: 2017-09-09

## 2017-09-08 RX ORDER — CEFAZOLIN SODIUM 2 G/100ML
2 INJECTION, SOLUTION INTRAVENOUS EVERY 8 HOURS
Status: COMPLETED | OUTPATIENT
Start: 2017-09-08 | End: 2017-09-09

## 2017-09-08 RX ORDER — FENTANYL CITRATE 50 UG/ML
INJECTION, SOLUTION INTRAMUSCULAR; INTRAVENOUS AS NEEDED
Status: DISCONTINUED | OUTPATIENT
Start: 2017-09-08 | End: 2017-09-08 | Stop reason: SURG

## 2017-09-08 RX ORDER — SIMETHICONE 80 MG
80 TABLET,CHEWABLE ORAL 4 TIMES DAILY PRN
Status: DISCONTINUED | OUTPATIENT
Start: 2017-09-08 | End: 2017-09-09 | Stop reason: HOSPADM

## 2017-09-08 RX ORDER — HYDROMORPHONE HYDROCHLORIDE 2 MG/1
2 TABLET ORAL EVERY 4 HOURS PRN
Status: DISCONTINUED | OUTPATIENT
Start: 2017-09-08 | End: 2017-09-09 | Stop reason: HOSPADM

## 2017-09-08 RX ORDER — ONDANSETRON 2 MG/ML
4 INJECTION INTRAMUSCULAR; INTRAVENOUS EVERY 6 HOURS PRN
Status: DISCONTINUED | OUTPATIENT
Start: 2017-09-08 | End: 2017-09-09 | Stop reason: HOSPADM

## 2017-09-08 RX ORDER — HYDROMORPHONE HYDROCHLORIDE 1 MG/ML
0.5 INJECTION, SOLUTION INTRAMUSCULAR; INTRAVENOUS; SUBCUTANEOUS
Status: DISCONTINUED | OUTPATIENT
Start: 2017-09-08 | End: 2017-09-08 | Stop reason: HOSPADM

## 2017-09-08 RX ORDER — FAMOTIDINE 10 MG/ML
20 INJECTION, SOLUTION INTRAVENOUS EVERY 12 HOURS SCHEDULED
Status: DISCONTINUED | OUTPATIENT
Start: 2017-09-08 | End: 2017-09-08 | Stop reason: HOSPADM

## 2017-09-08 RX ORDER — ONDANSETRON 2 MG/ML
INJECTION INTRAMUSCULAR; INTRAVENOUS AS NEEDED
Status: DISCONTINUED | OUTPATIENT
Start: 2017-09-08 | End: 2017-09-08 | Stop reason: SURG

## 2017-09-08 RX ORDER — SODIUM CHLORIDE 9 MG/ML
INJECTION, SOLUTION INTRAVENOUS AS NEEDED
Status: DISCONTINUED | OUTPATIENT
Start: 2017-09-08 | End: 2017-09-08 | Stop reason: HOSPADM

## 2017-09-08 RX ORDER — MAGNESIUM HYDROXIDE 1200 MG/15ML
LIQUID ORAL AS NEEDED
Status: DISCONTINUED | OUTPATIENT
Start: 2017-09-08 | End: 2017-09-08 | Stop reason: HOSPADM

## 2017-09-08 RX ORDER — ATRACURIUM BESYLATE 10 MG/ML
INJECTION, SOLUTION INTRAVENOUS AS NEEDED
Status: DISCONTINUED | OUTPATIENT
Start: 2017-09-08 | End: 2017-09-08 | Stop reason: SURG

## 2017-09-08 RX ORDER — DEXAMETHASONE SODIUM PHOSPHATE 4 MG/ML
INJECTION, SOLUTION INTRA-ARTICULAR; INTRALESIONAL; INTRAMUSCULAR; INTRAVENOUS; SOFT TISSUE AS NEEDED
Status: DISCONTINUED | OUTPATIENT
Start: 2017-09-08 | End: 2017-09-08 | Stop reason: SURG

## 2017-09-08 RX ORDER — LIDOCAINE HYDROCHLORIDE 10 MG/ML
0.5 INJECTION, SOLUTION EPIDURAL; INFILTRATION; INTRACAUDAL; PERINEURAL ONCE AS NEEDED
Status: COMPLETED | OUTPATIENT
Start: 2017-09-08 | End: 2017-09-08

## 2017-09-08 RX ORDER — LORAZEPAM 2 MG/ML
0.5 INJECTION INTRAMUSCULAR EVERY 12 HOURS PRN
Status: DISCONTINUED | OUTPATIENT
Start: 2017-09-08 | End: 2017-09-09 | Stop reason: HOSPADM

## 2017-09-08 RX ORDER — GLYCOPYRROLATE 0.2 MG/ML
INJECTION INTRAMUSCULAR; INTRAVENOUS AS NEEDED
Status: DISCONTINUED | OUTPATIENT
Start: 2017-09-08 | End: 2017-09-08 | Stop reason: SURG

## 2017-09-08 RX ORDER — LIDOCAINE HYDROCHLORIDE 10 MG/ML
0.5 INJECTION, SOLUTION EPIDURAL; INFILTRATION; INTRACAUDAL; PERINEURAL ONCE AS NEEDED
Status: DISCONTINUED | OUTPATIENT
Start: 2017-09-08 | End: 2017-09-08 | Stop reason: HOSPADM

## 2017-09-08 RX ORDER — FAMOTIDINE 20 MG/1
20 TABLET, FILM COATED ORAL EVERY 12 HOURS SCHEDULED
Status: DISCONTINUED | OUTPATIENT
Start: 2017-09-08 | End: 2017-09-08 | Stop reason: HOSPADM

## 2017-09-08 RX ORDER — SODIUM CHLORIDE 0.9 % (FLUSH) 0.9 %
1-10 SYRINGE (ML) INJECTION AS NEEDED
Status: DISCONTINUED | OUTPATIENT
Start: 2017-09-08 | End: 2017-09-08 | Stop reason: HOSPADM

## 2017-09-08 RX ORDER — CHLORHEXIDINE GLUCONATE 0.12 MG/ML
15 RINSE ORAL ONCE
Status: COMPLETED | OUTPATIENT
Start: 2017-09-08 | End: 2017-09-08

## 2017-09-08 RX ORDER — HYDROCODONE BITARTRATE AND ACETAMINOPHEN 7.5; 325 MG/1; MG/1
1 TABLET ORAL EVERY 4 HOURS PRN
Status: DISCONTINUED | OUTPATIENT
Start: 2017-09-08 | End: 2017-09-09 | Stop reason: HOSPADM

## 2017-09-08 RX ORDER — CEFAZOLIN SODIUM 2 G/100ML
2 INJECTION, SOLUTION INTRAVENOUS ONCE
Status: COMPLETED | OUTPATIENT
Start: 2017-09-08 | End: 2017-09-08

## 2017-09-08 RX ORDER — PANTOPRAZOLE SODIUM 40 MG/10ML
40 INJECTION, POWDER, LYOPHILIZED, FOR SOLUTION INTRAVENOUS ONCE
Status: COMPLETED | OUTPATIENT
Start: 2017-09-08 | End: 2017-09-08

## 2017-09-08 RX ORDER — METOCLOPRAMIDE HYDROCHLORIDE 5 MG/ML
10 INJECTION INTRAMUSCULAR; INTRAVENOUS EVERY 6 HOURS PRN
Status: DISCONTINUED | OUTPATIENT
Start: 2017-09-08 | End: 2017-09-09 | Stop reason: HOSPADM

## 2017-09-08 RX ORDER — PROMETHAZINE HYDROCHLORIDE 25 MG/ML
12.5 INJECTION, SOLUTION INTRAMUSCULAR; INTRAVENOUS EVERY 6 HOURS PRN
Status: DISCONTINUED | OUTPATIENT
Start: 2017-09-08 | End: 2017-09-09 | Stop reason: HOSPADM

## 2017-09-08 RX ORDER — BUPROPION HYDROCHLORIDE 150 MG/1
300 TABLET ORAL DAILY
Status: DISCONTINUED | OUTPATIENT
Start: 2017-09-08 | End: 2017-09-09 | Stop reason: HOSPADM

## 2017-09-08 RX ORDER — ONDANSETRON 4 MG/1
4 TABLET, FILM COATED ORAL EVERY 6 HOURS PRN
Status: DISCONTINUED | OUTPATIENT
Start: 2017-09-08 | End: 2017-09-09 | Stop reason: HOSPADM

## 2017-09-08 RX ORDER — FENTANYL CITRATE 50 UG/ML
50 INJECTION, SOLUTION INTRAMUSCULAR; INTRAVENOUS
Status: DISCONTINUED | OUTPATIENT
Start: 2017-09-08 | End: 2017-09-08 | Stop reason: HOSPADM

## 2017-09-08 RX ORDER — PANTOPRAZOLE SODIUM 40 MG/10ML
40 INJECTION, POWDER, LYOPHILIZED, FOR SOLUTION INTRAVENOUS
Status: DISCONTINUED | OUTPATIENT
Start: 2017-09-09 | End: 2017-09-09 | Stop reason: HOSPADM

## 2017-09-08 RX ORDER — CLONIDINE HYDROCHLORIDE 0.1 MG/1
0.1 TABLET ORAL EVERY 6 HOURS PRN
Status: DISCONTINUED | OUTPATIENT
Start: 2017-09-08 | End: 2017-09-09 | Stop reason: HOSPADM

## 2017-09-08 RX ORDER — FIBRINOGEN HUMAN AND THROMBIN HUMAN 10 ML
KIT TOPICAL AS NEEDED
Status: DISCONTINUED | OUTPATIENT
Start: 2017-09-08 | End: 2017-09-08 | Stop reason: HOSPADM

## 2017-09-08 RX ORDER — SODIUM CHLORIDE, SODIUM LACTATE, POTASSIUM CHLORIDE, CALCIUM CHLORIDE 600; 310; 30; 20 MG/100ML; MG/100ML; MG/100ML; MG/100ML
150 INJECTION, SOLUTION INTRAVENOUS CONTINUOUS
Status: DISCONTINUED | OUTPATIENT
Start: 2017-09-08 | End: 2017-09-09 | Stop reason: HOSPADM

## 2017-09-08 RX ORDER — SODIUM CHLORIDE, SODIUM LACTATE, POTASSIUM CHLORIDE, CALCIUM CHLORIDE 600; 310; 30; 20 MG/100ML; MG/100ML; MG/100ML; MG/100ML
150 INJECTION, SOLUTION INTRAVENOUS CONTINUOUS
Status: DISCONTINUED | OUTPATIENT
Start: 2017-09-08 | End: 2017-09-08 | Stop reason: HOSPADM

## 2017-09-08 RX ORDER — TRAZODONE HYDROCHLORIDE 100 MG/1
100 TABLET ORAL NIGHTLY
Status: DISCONTINUED | OUTPATIENT
Start: 2017-09-08 | End: 2017-09-09 | Stop reason: HOSPADM

## 2017-09-08 RX ORDER — NALOXONE HCL 0.4 MG/ML
0.4 VIAL (ML) INJECTION
Status: DISCONTINUED | OUTPATIENT
Start: 2017-09-08 | End: 2017-09-09 | Stop reason: HOSPADM

## 2017-09-08 RX ORDER — DIPHENHYDRAMINE HYDROCHLORIDE 50 MG/ML
25 INJECTION INTRAMUSCULAR; INTRAVENOUS EVERY 4 HOURS PRN
Status: DISCONTINUED | OUTPATIENT
Start: 2017-09-08 | End: 2017-09-09 | Stop reason: HOSPADM

## 2017-09-08 RX ORDER — LORAZEPAM 1 MG/1
1 TABLET ORAL EVERY 12 HOURS PRN
Status: DISCONTINUED | OUTPATIENT
Start: 2017-09-08 | End: 2017-09-09 | Stop reason: HOSPADM

## 2017-09-08 RX ORDER — BUSPIRONE HYDROCHLORIDE 10 MG/1
10 TABLET ORAL AS NEEDED
Status: DISCONTINUED | OUTPATIENT
Start: 2017-09-08 | End: 2017-09-09 | Stop reason: HOSPADM

## 2017-09-08 RX ORDER — CHOLECALCIFEROL (VITAMIN D3) 125 MCG
5 CAPSULE ORAL NIGHTLY
COMMUNITY

## 2017-09-08 RX ORDER — FLUOXETINE HYDROCHLORIDE 20 MG/1
40 CAPSULE ORAL DAILY
Status: DISCONTINUED | OUTPATIENT
Start: 2017-09-09 | End: 2017-09-09 | Stop reason: HOSPADM

## 2017-09-08 RX ORDER — LIDOCAINE HYDROCHLORIDE 10 MG/ML
INJECTION, SOLUTION INFILTRATION; PERINEURAL AS NEEDED
Status: DISCONTINUED | OUTPATIENT
Start: 2017-09-08 | End: 2017-09-08 | Stop reason: SURG

## 2017-09-08 RX ORDER — FAMOTIDINE 20 MG/1
20 TABLET, FILM COATED ORAL EVERY 12 HOURS SCHEDULED
Status: CANCELLED | OUTPATIENT
Start: 2017-09-08

## 2017-09-08 RX ORDER — SCOLOPAMINE TRANSDERMAL SYSTEM 1 MG/1
1 PATCH, EXTENDED RELEASE TRANSDERMAL ONCE
Status: DISCONTINUED | OUTPATIENT
Start: 2017-09-08 | End: 2017-09-08

## 2017-09-08 RX ORDER — PROPOFOL 10 MG/ML
VIAL (ML) INTRAVENOUS AS NEEDED
Status: DISCONTINUED | OUTPATIENT
Start: 2017-09-08 | End: 2017-09-08 | Stop reason: SURG

## 2017-09-08 RX ORDER — BUPIVACAINE HYDROCHLORIDE AND EPINEPHRINE 2.5; 5 MG/ML; UG/ML
INJECTION, SOLUTION EPIDURAL; INFILTRATION; INTRACAUDAL; PERINEURAL AS NEEDED
Status: DISCONTINUED | OUTPATIENT
Start: 2017-09-08 | End: 2017-09-08 | Stop reason: HOSPADM

## 2017-09-08 RX ORDER — LOSARTAN POTASSIUM 25 MG/1
25 TABLET ORAL DAILY
Status: DISCONTINUED | OUTPATIENT
Start: 2017-09-09 | End: 2017-09-09 | Stop reason: HOSPADM

## 2017-09-08 RX ORDER — FAMOTIDINE 10 MG/ML
20 INJECTION, SOLUTION INTRAVENOUS EVERY 12 HOURS SCHEDULED
Status: CANCELLED | OUTPATIENT
Start: 2017-09-08

## 2017-09-08 RX ORDER — LABETALOL HYDROCHLORIDE 5 MG/ML
10 INJECTION, SOLUTION INTRAVENOUS
Status: DISCONTINUED | OUTPATIENT
Start: 2017-09-08 | End: 2017-09-09 | Stop reason: HOSPADM

## 2017-09-08 RX ORDER — SODIUM CHLORIDE AND POTASSIUM CHLORIDE 150; 450 MG/100ML; MG/100ML
125 INJECTION, SOLUTION INTRAVENOUS CONTINUOUS
Status: DISCONTINUED | OUTPATIENT
Start: 2017-09-09 | End: 2017-09-09 | Stop reason: HOSPADM

## 2017-09-08 RX ORDER — MORPHINE SULFATE 4 MG/ML
4 INJECTION, SOLUTION INTRAMUSCULAR; INTRAVENOUS
Status: DISCONTINUED | OUTPATIENT
Start: 2017-09-08 | End: 2017-09-09 | Stop reason: HOSPADM

## 2017-09-08 RX ADMIN — FENTANYL CITRATE 50 MCG: 50 INJECTION, SOLUTION INTRAMUSCULAR; INTRAVENOUS at 15:32

## 2017-09-08 RX ADMIN — SODIUM CHLORIDE, POTASSIUM CHLORIDE, SODIUM LACTATE AND CALCIUM CHLORIDE 1000 ML: 600; 310; 30; 20 INJECTION, SOLUTION INTRAVENOUS at 11:31

## 2017-09-08 RX ADMIN — SODIUM CHLORIDE, POTASSIUM CHLORIDE, SODIUM LACTATE AND CALCIUM CHLORIDE: 600; 310; 30; 20 INJECTION, SOLUTION INTRAVENOUS at 13:53

## 2017-09-08 RX ADMIN — HYDROMORPHONE HYDROCHLORIDE 0.5 MG: 1 INJECTION, SOLUTION INTRAMUSCULAR; INTRAVENOUS; SUBCUTANEOUS at 15:45

## 2017-09-08 RX ADMIN — PROPOFOL 200 MG: 10 INJECTION, EMULSION INTRAVENOUS at 13:53

## 2017-09-08 RX ADMIN — FENTANYL CITRATE 50 MCG: 50 INJECTION, SOLUTION INTRAMUSCULAR; INTRAVENOUS at 13:53

## 2017-09-08 RX ADMIN — DEXAMETHASONE SODIUM PHOSPHATE 8 MG: 4 INJECTION, SOLUTION INTRAMUSCULAR; INTRAVENOUS at 13:53

## 2017-09-08 RX ADMIN — CHLORHEXIDINE GLUCONATE 15 ML: 1.2 RINSE ORAL at 11:31

## 2017-09-08 RX ADMIN — ONDANSETRON 4 MG: 2 INJECTION INTRAMUSCULAR; INTRAVENOUS at 15:05

## 2017-09-08 RX ADMIN — HYDROMORPHONE HYDROCHLORIDE 0.5 MG: 1 INJECTION, SOLUTION INTRAMUSCULAR; INTRAVENOUS; SUBCUTANEOUS at 15:40

## 2017-09-08 RX ADMIN — SODIUM CHLORIDE, POTASSIUM CHLORIDE, SODIUM LACTATE AND CALCIUM CHLORIDE 150 ML/HR: 600; 310; 30; 20 INJECTION, SOLUTION INTRAVENOUS at 16:38

## 2017-09-08 RX ADMIN — SODIUM CHLORIDE, POTASSIUM CHLORIDE, SODIUM LACTATE AND CALCIUM CHLORIDE 150 ML/HR: 600; 310; 30; 20 INJECTION, SOLUTION INTRAVENOUS at 22:03

## 2017-09-08 RX ADMIN — Medication 3 MG: at 15:21

## 2017-09-08 RX ADMIN — Medication 10 ML: at 11:33

## 2017-09-08 RX ADMIN — CEFAZOLIN SODIUM 2 G: 2 INJECTION, SOLUTION INTRAVENOUS at 22:03

## 2017-09-08 RX ADMIN — PROMETHAZINE HYDROCHLORIDE 6.25 MG: 25 INJECTION INTRAMUSCULAR; INTRAVENOUS at 15:58

## 2017-09-08 RX ADMIN — CHLORHEXIDINE GLUCONATE 15 ML: 1.2 RINSE ORAL at 11:42

## 2017-09-08 RX ADMIN — MORPHINE SULFATE 4 MG: 4 INJECTION, SOLUTION INTRAMUSCULAR; INTRAVENOUS at 19:59

## 2017-09-08 RX ADMIN — LIDOCAINE HYDROCHLORIDE 50 MG: 10 INJECTION, SOLUTION INFILTRATION; PERINEURAL at 13:53

## 2017-09-08 RX ADMIN — PROPOFOL 20 MCG/KG/MIN: 10 INJECTION, EMULSION INTRAVENOUS at 13:57

## 2017-09-08 RX ADMIN — TRAZODONE HYDROCHLORIDE 100 MG: 100 TABLET ORAL at 22:03

## 2017-09-08 RX ADMIN — LIDOCAINE HYDROCHLORIDE 0.5 ML: 10 INJECTION, SOLUTION EPIDURAL; INFILTRATION; INTRACAUDAL; PERINEURAL at 11:32

## 2017-09-08 RX ADMIN — GLYCOPYRROLATE 0.4 MG: 0.2 INJECTION, SOLUTION INTRAMUSCULAR; INTRAVENOUS at 15:21

## 2017-09-08 RX ADMIN — CEFAZOLIN SODIUM 2 G: 2 INJECTION, SOLUTION INTRAVENOUS at 13:49

## 2017-09-08 RX ADMIN — MORPHINE SULFATE 4 MG: 4 INJECTION, SOLUTION INTRAMUSCULAR; INTRAVENOUS at 22:09

## 2017-09-08 RX ADMIN — SCOPALAMINE 1 PATCH: 1 PATCH, EXTENDED RELEASE TRANSDERMAL at 11:31

## 2017-09-08 RX ADMIN — SIMETHICONE CHEW TAB 80 MG 80 MG: 80 TABLET ORAL at 19:59

## 2017-09-08 RX ADMIN — PANTOPRAZOLE SODIUM 40 MG: 40 INJECTION, POWDER, FOR SOLUTION INTRAVENOUS at 11:31

## 2017-09-08 RX ADMIN — ATRACURIUM BESYLATE 50 MG: 10 INJECTION, SOLUTION INTRAVENOUS at 13:53

## 2017-09-08 NOTE — ANESTHESIA PREPROCEDURE EVALUATION
Anesthesia Evaluation     Patient summary reviewed and Nursing notes reviewed   no history of anesthetic complications:  NPO Solid Status: > 8 hours  NPO Liquid Status: > 2 hours     Airway   Mallampati: II  TM distance: >3 FB  Neck ROM: full  Dental - normal exam     Pulmonary    (+) shortness of breath, decreased breath sounds,   Cardiovascular - normal exam  Exercise tolerance: good (4-7 METS)    Rhythm: regular  Rate: normal    (+) hypertension,   (-) valvular problems/murmurs      Neuro/Psych  (+) psychiatric history Depression,    GI/Hepatic/Renal/Endo    (+) morbid obesity,   (-)  obesity    Musculoskeletal     Abdominal   (+) obese,     Abdomen: soft.   Substance History      OB/GYN          Other   (+) arthritis                                     Anesthesia Plan    ASA 3     general and regional     intravenous induction   Anesthetic plan and risks discussed with patient.    Plan discussed with CRNA.

## 2017-09-08 NOTE — BRIEF OP NOTE
GASTRIC SLEEVE LAPAROSCOPIC, ESOPHAGOGASTRODUODENOSCOPY  Procedure Note    Lindsey Sprague  9/8/2017    Pre-op Diagnosis:   Obesity, Class III, BMI 40-49.9 (morbid obesity) [E66.01]    Post-op Diagnosis:     Post-Op Diagnosis Codes:     * Obesity, Class III, BMI 40-49.9 (morbid obesity) [E66.01]    Procedure/CPT® Codes:  MA LAP, MARIELLE RESTRICT PROC, LONGITUDINAL GASTRECTOMY [07014]  MA ESOPHAGOGASTRODUODENOSCOPY TRANSORAL DIAGNOSTIC [01472]    Procedure(s):  GASTRIC SLEEVE LAPAROSCOPIC  ESOPHAGOGASTRODUODENOSCOPY    Surgeon(s):  Karthik Campuzano MD   Asst:  Ladi Archibald MD PGY-3    Anesthesia: General with Block    Staff:   Circulator: Trinidad ALEMAN RN  Scrub Person: Milana Turner; Jeniffer Nieto  Nursing Assistant: Breanna Araiza    Estimated Blood Loss: *No blood loss documented*  Urine Voided: * No values recorded between 9/8/2017  1:48 PM and 9/8/2017  3:24 PM *    Specimens:                  ID Type Source Tests Collected by Time Destination   A : SUB-TOTAL GASTRECTOMY Tissue Stomach TISSUE EXAM Karthik Campuzano MD 9/8/2017 1510          Drains:           Findings:       Complications: none      Karthik Campuzano MD     Date: 9/8/2017  Time: 3:24 PM

## 2017-09-08 NOTE — ANESTHESIA POSTPROCEDURE EVALUATION
Patient: Lindsey Sprague    Procedure Summary     Date Anesthesia Start Anesthesia Stop Room / Location    09/08/17 1348  [facility] / [facility]       Procedure Diagnosis Surgeon Provider    GASTRIC SLEEVE LAPAROSCOPIC (N/A Abdomen); ESOPHAGOGASTRODUODENOSCOPY (N/A Esophagus) Obesity, Class III, BMI 40-49.9 (morbid obesity)  (Obesity, Class III, BMI 40-49.9 (morbid obesity) [E66.01]) MD Cain Mendoza MD          Anesthesia Type: general, regional  Last vitals  BP     141/82   Temp 97.5   Pulse 84   Resp 16   SpO2 96     Post Anesthesia Care and Evaluation    Patient location during evaluation: PACU  Patient participation: complete - patient participated  Level of consciousness: awake and alert  Pain score: 0  Pain management: adequate  Airway patency: patent  Anesthetic complications: No anesthetic complications  PONV Status: none  Cardiovascular status: hemodynamically stable and acceptable  Respiratory status: nonlabored ventilation, acceptable and nasal cannula  Hydration status: acceptable

## 2017-09-08 NOTE — OP NOTE
Preoperative Diagnosis:   Morbid Obesity with Multiple Co-Morbidities s/p LapBand placement with posterior hiatal hernia repair 3/11, LapBand removal 2/17    Postoperative Diagnosis:   Same    Procedure:                                                      Laparoscopic Sleeve Gastrectomy (85% subtotal vertical gastrectomy) over a 36 Micronesian Bougie Dilator                                                                        EGD    Surgeon:                                                       JOSE Campuzano MD    Asst:                                                             Ladi Archibald MD PGY-3    Anesthesia:                                                   GETA    EBL:                                                              50 cc    Fluids:                                                           Crystalloid    Specimens:                                                   Subtotal gastrectomy    Drains:                                                           None    Counts:                                                          Correct    Complications:                                               None    Indications:   This is a 37-year-old morbidly obese white female known to me s/p LapBand/HHR 3/11, removal for intolerance 2/17 who presents for elective laparoscopic sleeve gastrectomy.  Pt is aware that LSG after prev AGB/AGBR carries increased risk over primary LSG alone, kenya wrt bleeding, infection, leak, prolonged OR times with incr risk pulm complications and VTE, etc and still wishes to proceedShe's undergone extensive preoperative education teaching and consent process everything's in order.    Operative technique:     The patient was brought to the operating room, and placed supine upon the operating room table.  SCD hose were placed, she underwent uneventful general endotracheal anesthesia per the anesthesiology staff, she received IV Ancef and subcutaneous Lovenox, the anesthesiology  staff performed a tap block, and her abdomen was prepped and draped with ChloraPrep in a sterile fashion, an Ioban was used as well, a Mario catheter was not placed.    The peritoneal cavity was entered in the upper abdomen to the left of midline using an 11 mm trocar and an Optiview technique and the abdomen was insufflated to a pressure of 15 mmHg with CO2 gas.  Exploratory laparoscopy revealed no evidence of injury from the entrance technique, a normal appearing liver, no other abnormalities noted.  Remaining trochars were placed under direct visualization utilizing old scars where possible including an additional 11 mm trocar in the left mid abdomen, 5 mm trochars in the right upper quadrant and left subcostal position, and in the upper abdomen to the right of midline a 15 mm trocar was placed.  Through a stab incision in the epigastrium and Broderick retractors used to elevate the left lobe of the liver - there were adhesions of the lesser omentum and fundus to the undersurface of the liver.  These were lysed exposing the hiatus.  There was some capsular bleeding which was controlled with a RayTec and FloSeal.  There was no visible hiatal hernia from the anterior view and this was photodocumented.  Beginning approximately two thirds of the way around the greater curvature the stomach, the gastrocolic vessels were divided with the Harmonic scalpel.  This proceeded proximally taking down all the short gastric vessels and exposing the left nan.  Her were no posterior hernias or lipomas and this was photodocumented.  The old posterior band tunnel was opened up taking care to avoid a gastrotomy.  Gastrocolic vessels were then divided medially to 5 cm proximal to the pylorus.  Some of the filmy attachments of the posterior stomach to the pancreas and retroperitoneum were divided.  The remaining anterior imbrications were opened up until the intended path of the sleeve gastrectomy contained no redundant gastric  tissue.  The anesthesiology staff passed a 36 Cuban blunt tip bougie dilator which was manipulated along the lesser curvature into the distal antrum.  The 85% subtotal vertical sleeve gastrectomy was then performed over the 36 Cuban bougie dilator using an Ippieselon 60 mm articulating electric GST stapler.  The first firing was a black load, the next 4 firings were green loads.  All firings included a single absorbable Veritas indio-strip.  After clamping down the final green load and prior to firing it the bougie dilator was removed.  The sleeve was performed such that it was uniform in size, no hourglassing or narrowing, especially at the angularis, and the final firing was done a centimeter away from the angle of His to hopefully avoid incorporating esophageal fibers.  The subtotal gastrectomy specimen was placed into a large retrieval bag and withdrawn and placed on a separate Eden stand, it was a slightly smaller than average size specimen.  The sleeve was submerged under saline.  Upper endoscopy was performed, and the endoscope was advanced into the duodenal bulb.  No air bubbles or leak seen, no bleeding at the staple line, no narrowing at the angularis, no pyloric spasm or deformity, no gastritis, no redundant cardia or evidence of previous banding, no hiatal hernia or Molina's esophagus, and the endoscope was withdrawn.  The subtotal gastrectomy specimen was inspected, staples were all well formed confirming laparoscopic findings, it was sent unopened to pathology for permanent section.  Irrigation fluid was suctioned free.  The sleeve was resting nicely and hemostatic.  The liver remained hemostatic.  The sleeve staple line was treated with 10 cc of aerosolized Tisseel fibrin glue.  Photodocumentation of the sleeve was obtained.  The Broderick retractor was removed.  Fascia at the 15 mm trocar site incision was closed with a horizontal mattress 0 Vicryl suture placed with a suture passer under direct  visualization and tying the knot extracorporeally, fascia was infiltrated approximately 10 cc of quarter percent Marcaine with epinephrine with the okay from anesthesia.  Remaining trochars were removed under direct visualization, no bleeding noted from their sites.  Subcutaneous tissue in the 15 mm incision was closed with a figure-of-eight 2-0 Vicryl plus suture, and skin in each incision was closed using 3-0 Monocryl plus in an interrupted subcuticular stitch followed by skin-a-fix.  The patient tolerated the procedure well without complication, was taken to the recovery room in stable condition.

## 2017-09-08 NOTE — ANESTHESIA PROCEDURE NOTES
Airway  Urgency: elective    Date/Time: 9/8/2017 1:56 PM  End Time:9/8/2017 1:56 PM  Airway not difficult    General Information and Staff    Patient location during procedure: OR  CRNA: OLGA LIDIA ROJAS    Indications and Patient Condition  Indications for airway management: airway protection    Preoxygenated: yes  MILS not maintained throughout  Mask difficulty assessment: 1 - vent by mask    Final Airway Details  Final airway type: endotracheal airway      Successful airway: ETT  Cuffed: yes   Successful intubation technique: direct laryngoscopy  Endotracheal tube insertion site: oral  Blade: Surekha  Blade size: #3  ETT size: 7.0 mm  Cormack-Lehane Classification: grade I - full view of glottis  Placement verified by: chest auscultation and capnometry   Cuff volume (mL): 5  Measured from: lips  ETT to lips (cm): 20  Number of attempts at approach: 1    Additional Comments  Negative epigastric sounds, Breath sound equal bilaterally with symmetric chest rise and fall

## 2017-09-08 NOTE — PLAN OF CARE
Problem: Patient Care Overview (Adult)  Goal: Plan of Care Review  Outcome: Ongoing (interventions implemented as appropriate)    09/08/17 1704   Coping/Psychosocial Response Interventions   Plan Of Care Reviewed With patient;spouse   Patient Care Overview   Progress improving         Problem: Bariatric Surgery (Open/Laparoscopic) (Adult,Pediatric)  Goal: Signs and Symptoms of Listed Potential Problems Will be Absent or Manageable (Bariatric Surgery)  Outcome: Ongoing (interventions implemented as appropriate)    09/08/17 1704   Bariatric Surgery (Open/Laparoscopic)   Problems Assessed (Bariatric Surgery) pain   Problems Present (Bariatric Surgery) pain

## 2017-09-08 NOTE — ANESTHESIA PROCEDURE NOTES
Peripheral Block    Patient location during procedure: OR  Start time: 9/8/2017 1:53 PM  Stop time: 9/8/2017 1:58 PM  Reason for block: at surgeon's request and post-op pain management  Performed by  Anesthesiologist: AJTIN ZARATE  Preanesthetic Checklist  Completed: patient identified, site marked, surgical consent, pre-op evaluation, timeout performed, IV checked, risks and benefits discussed and monitors and equipment checked  Prep:  Pt Position: supine  Sterile barriers:cap, gloves, gown and mask  Prep: ChloraPrep  Patient monitoring: blood pressure monitoring, continuous pulse oximetry and EKG  Procedure  Sedation:yes  Performed under: general  Guidance:ultrasound guided and landmark technique  Images:still images obtained    Laterality:Bilateral  Block Type:TAP  Injection Technique:single-shot  Needle Type:short-bevel  Needle Gauge:20 G    Medications  Preservative Free Saline:10ml  Comment:Block Injection:  Total volume of LA divided between Right and Left sided blocks       Adjuncts:   Buprenorphine 0.30 mcg ,Decadron 4 mg PSF  Local Injected:bupivacaine 0.25% Local Amount Injected:60mL  Post Assessment  Injection Assessment: negative aspiration for heme and incremental injection  Patient Tolerance:comfortable throughout block  Complications:no  Additional Notes  The pt. Was placed in the Supine Position and GA was induced     The insertion site was prepped with CHG and Ultrasound guidance with In-Plane techniquewas  a 4inch BBraun 360 degree echogenic needle was visualized.  Normal Saline PSF was  utilized for hydrodissection of tissue. PECS 1 Block- Pectoralis Major and Minor where identified and LA was injected between PMM and PmM at the level of the 3rd Rib(10ml),  PECS 2-  Pectoralis Minor and Serratus muscle where identified and the needle was advanced laterally in-plane with the 4th rib as a backstop, pleura was monitored.  LA was injected between SA and PmM at the level of 4th rib( 20ml).  LA  injection spread was visualized, injection was incremental 1-5ml, normal or low injection pressure, no intravascular injection, no pneumothorax appreciated.  Thank You.

## 2017-09-08 NOTE — H&P (VIEW-ONLY)
[facility] BARIATRIC SURGERY  [address]  [facility] [address]  [phone]      Patient  Name:  Lindsey Sprague.  :  1980      Date of Visit: 2017      Chief Complaint:  weight gain; unable to maintain weight loss.  Preop LSG    History of Present Illness:  Lindsey Sprague is a 37 y.o. female who presents today for evaluation, education and consultation regarding weight loss surgery. The patient is interested in sleeve gastrectomy.     s/p LAGB APS w/ HHR 3/2011 by [facility] followed by LAGBR by Dr. Campuzano on 17 for chronic pain/nausea w/ pouch enlargement.  Pain immediately resolved w/ band removal.  Unfortunately gained 34 lbs since band removed. Now pursuing revision.    Lindsey has been overweight for at least 25 years, has been 35 pounds or more overweight for at least 20 years and started dieting at age 10.  Lost and maintained 50 lbs w/ lapband.    Returns for final visit prior to LSG.  Orig wanted LSG but ins wouldn't cover it at the time.  Pt is aware that LSG after prev AGB/AGBR carries increased risk over primary LSG alone, kenya wrt bleeding, infection, leak, prolonged OR times with incr risk pulm complications and VTE, etc and still wishes to proceed.  At the time of her banding I did a full hiatal dissection and 3 stitch post HHR.  Says since AGBR only rare SHELBIE such as when eats red sauces.      Past Medical History:   Diagnosis Date   • Depression    • Dyspepsia     Denies GB sx's.  serum h. pyl neg   • Dyspnea on exertion    • Elevated HDL     81   • Elevated LDL cholesterol level     102   • Fatigue    • Heart murmur     heard on exam today, no hx, asx   • Heartburn     infrequent, no meds, poss recurrent HH on EGD 2017 [facility]   • Hypertension    • Menstrual irregularity    • Morbid obesity    • Polycystic ovarian syndrome      Past Surgical History:   Procedure Laterality Date   • GASTRIC BANDING REMOVAL  2017    s/p AGBR 17 by [facility] for chronic  pain/nausea/pouch enlargement   • LAPAROSCOPIC GASTRIC BANDING  2011    s/p LAGB APS w/ HHR 3/2011 by [facility]   • TONSILLECTOMY  1994       No Known Allergies      Current Outpatient Prescriptions:   •  buPROPion XL (WELLBUTRIN XL) 300 MG 24 hr tablet, Take 300 mg by mouth Daily., Disp: , Rfl:   •  busPIRone (BUSPAR) 10 MG tablet, Take 10 mg by mouth As Needed., Disp: , Rfl:   •  FLUoxetine (PROzac) 40 MG capsule, Take 40 mg by mouth Daily., Disp: , Rfl:   •  hydrochlorothiazide (HYDRODIURIL) 25 MG tablet, Take 25 mg by mouth Daily., Disp: , Rfl:   •  levonorgestrel (MIRENA) 20 MCG/24HR IUD, 1 each by Intrauterine route 1 (One) Time., Disp: , Rfl:   •  losartan (COZAAR) 25 MG tablet, Take 25 mg by mouth Daily., Disp: , Rfl:   •  traZODone (DESYREL) 100 MG tablet, Take 100 mg by mouth Every Night., Disp: , Rfl:     Social History     Social History   • Marital status:      Spouse name: N/A   • Number of children: N/A   • Years of education: N/A     Occupational History   • Not on file.     Social History Main Topics   • Smoking status: Never Smoker   • Smokeless tobacco: Not on file   • Alcohol use No   • Drug use: Not on file   • Sexual activity: Not on file     Other Topics Concern   • Not on file     Social History Narrative    .  Lives in [place] w/  and 3 children.   at [facility].       Family History   Problem Relation Age of Onset   • Hypertension Mother    • Breast cancer Paternal Grandmother      Review of Systems:  Constitutional:  The patient reports fatigue, weight gain and denies fevers and chills.  Cardiovascular:  The patient reports HTN and denies heart disease and DVT.  Respiratory:  The patient denies asthma, apnea and PE.  Gastrointestinal:  The patient reports heartburn and denies liver disease.  Genitourinary:  The patient denies renal insufficiency.    Musculoskeletal:  The patient denies joint pain, fibromyalgia and  arthritis.  Neurological:  The patient denies seizure and stroke.  Psychiatric:  The patient reports anxiety, depression and denies bipolar disorder.  Endocrine:  The patient denies diabetes and thyroid disease.  Hematologic:  The patient denies bleeding disorder.  Skin:  The patient denies MRSA.    Physical Exam:  Vital Signs:  Weight: 274 lb 8 oz (125 kg)   Body mass index is 41.74 kg/(m^2).  Temp: 97.5 °F (36.4 °C)   Heart Rate: 77   BP: 125/80     Physical Exam   Constitutional: She is oriented to person, place, and time. She appears well-developed and well-nourished.   HENT:   Head: Normocephalic and atraumatic.   Eyes: Conjunctivae are normal. No scleral icterus.   Neck: Neck supple. Carotid bruit is not present. No thyromegaly present.   Cardiovascular: Normal rate and regular rhythm.    Murmur heard.   Systolic murmur is present with a grade of 2/6   Left 2nd ICS   Pulmonary/Chest: Effort normal and breath sounds normal. No respiratory distress. She has no wheezes. She has no rales.   Abdominal: Soft. Bowel sounds are normal. She exhibits no distension and no mass. There is no hepatosplenomegaly. There is no tenderness. No hernia.   scars:  AGB/AGBR scars   Musculoskeletal: Normal range of motion. She exhibits no edema.   Neurological: She is alert and oriented to person, place, and time. Gait normal.   Skin: Skin is warm and dry. No rash noted.   Psychiatric: She has a normal mood and affect. Judgment normal.   Vitals reviewed.         Patient Active Problem List   Diagnosis   • Fatigue   • Heartburn   • Hypertension   • Polycystic ovarian syndrome   • Depression   • Dyspepsia   • Dyspnea on exertion   • Morbid obesity   • Menstrual irregularity     Psych Brown 5/17 approp  Saman:  HC x 2, phen x 1    Assessment:    Lindsey Sprague is a 37 y.o. year old female with medically complicated obesity pursuing sleeve gastrectomy.    Weight loss surgery is deemed medically necessary given the following obesity  "related comorbidities including hypertension and polycystic ovarian disease with current Weight: 274 lb 8 oz (125 kg) and Body mass index is 41.74 kg/(m^2)..    Patient is aware that surgery is a tool, and that weight loss is not guaranteed but only seen in the context of appropriate use, follow up and exercise.    Complications  of laparoscopic/possible robotic gastric sleeve were discussed. The patient is well aware of the potential complications of surgery that include but not limited to bleeding, infections, deep venous thrombosis, pulmonary embolism, pulmonary complications such as pneumonia, cardiac events, hernias, small bowel obstruction, damage to the spleen or other organs, bowel injury, disfiguring scars, failure to lose weight, need for additional surgery, conversion to an open procedure, and death. Patient is also aware of complications which apply in this particular procedure that can include but are not limited to a \"leak\" at the staple line which in some instances may require conversion to gastric bypass.    Greater than 10 minutes was spent with the patient discussing avoiding all tobacco products and second hand smoke at least 2 weeks pre-operatively and 6 weeks post-operatively to minimize the risk of sleeve leak     Discussed the risks, benefits and alternative therapies at great length as outlined in our extensive consent forms, consent videos, and educational teaching process under the direction of the center's .    A copy of the patient's signed informed consent is on file.      Plan:  The consultation plan and program requirements were reviewed with the patient.  The patient has been advised that a letter of medical support must be obtained from her primary care physician or referring provider. A psychological evaluation will be arranged.  A nutritional evaluation will be performed.  The patient was advised to start a high protein and low carbohydrate diet.  Necessary " lifestyle modifications were discussed.  Instructions on how to access YOSEF was given to the patient.  YOSEF is an internet based educational video that explains the surgical procedure chosen and answers basic questions regarding that procedure.     Preoperative testing will include: CBC, CMP, Fasting Lipids, TSH, HgA1C, H.Pylori, CXR and EKG     Additional preop clearances required prior to surgery: None.      Patient understands that bariatric surgery is not cosmetic surgery but rather a tool to help make a lifelong commitment to lifestyle changes including diet, exercise, behavior modifications, and healthy habits.      Karthik Campuzano MD

## 2017-09-09 ENCOUNTER — APPOINTMENT (OUTPATIENT)
Dept: GENERAL RADIOLOGY | Facility: HOSPITAL | Age: 37
End: 2017-09-09

## 2017-09-09 VITALS
HEART RATE: 83 BPM | HEIGHT: 68 IN | BODY MASS INDEX: 41.6 KG/M2 | DIASTOLIC BLOOD PRESSURE: 94 MMHG | SYSTOLIC BLOOD PRESSURE: 136 MMHG | WEIGHT: 274.5 LBS | OXYGEN SATURATION: 95 % | TEMPERATURE: 98 F | RESPIRATION RATE: 18 BRPM

## 2017-09-09 LAB
ALBUMIN SERPL-MCNC: 3.8 G/DL (ref 3.2–4.8)
ALBUMIN/GLOB SERPL: 1.4 G/DL (ref 1.5–2.5)
ALP SERPL-CCNC: 65 U/L (ref 25–100)
ALT SERPL W P-5'-P-CCNC: 40 U/L (ref 7–40)
ANION GAP SERPL CALCULATED.3IONS-SCNC: 3 MMOL/L (ref 3–11)
AST SERPL-CCNC: 32 U/L (ref 0–33)
BASOPHILS # BLD AUTO: 0.01 10*3/MM3 (ref 0–0.2)
BASOPHILS NFR BLD AUTO: 0.1 % (ref 0–1)
BILIRUB SERPL-MCNC: 0.4 MG/DL (ref 0.3–1.2)
BUN BLD-MCNC: 10 MG/DL (ref 9–23)
BUN/CREAT SERPL: 12.5 (ref 7–25)
CALCIUM SPEC-SCNC: 8.8 MG/DL (ref 8.7–10.4)
CHLORIDE SERPL-SCNC: 105 MMOL/L (ref 99–109)
CO2 SERPL-SCNC: 28 MMOL/L (ref 20–31)
CREAT BLD-MCNC: 0.8 MG/DL (ref 0.6–1.3)
DEPRECATED RDW RBC AUTO: 39.9 FL (ref 37–54)
EOSINOPHIL # BLD AUTO: 0 10*3/MM3 (ref 0–0.3)
EOSINOPHIL NFR BLD AUTO: 0 % (ref 0–3)
ERYTHROCYTE [DISTWIDTH] IN BLOOD BY AUTOMATED COUNT: 13.1 % (ref 11.3–14.5)
GFR SERPL CREATININE-BSD FRML MDRD: 80 ML/MIN/1.73
GLOBULIN UR ELPH-MCNC: 2.8 GM/DL
GLUCOSE BLD-MCNC: 102 MG/DL (ref 70–100)
HCT VFR BLD AUTO: 39.4 % (ref 34.5–44)
HGB BLD-MCNC: 13.4 G/DL (ref 11.5–15.5)
IMM GRANULOCYTES # BLD: 0.03 10*3/MM3 (ref 0–0.03)
IMM GRANULOCYTES NFR BLD: 0.2 % (ref 0–0.6)
IRON 24H UR-MRATE: 43 MCG/DL (ref 50–175)
LYMPHOCYTES # BLD AUTO: 1.19 10*3/MM3 (ref 0.6–4.8)
LYMPHOCYTES NFR BLD AUTO: 8.8 % (ref 24–44)
MCH RBC QN AUTO: 28 PG (ref 27–31)
MCHC RBC AUTO-ENTMCNC: 34 G/DL (ref 32–36)
MCV RBC AUTO: 82.4 FL (ref 80–99)
MONOCYTES # BLD AUTO: 1.05 10*3/MM3 (ref 0–1)
MONOCYTES NFR BLD AUTO: 7.8 % (ref 0–12)
NEUTROPHILS # BLD AUTO: 11.23 10*3/MM3 (ref 1.5–8.3)
NEUTROPHILS NFR BLD AUTO: 83.1 % (ref 41–71)
PLATELET # BLD AUTO: 271 10*3/MM3 (ref 150–450)
PMV BLD AUTO: 9 FL (ref 6–12)
POTASSIUM BLD-SCNC: 4.1 MMOL/L (ref 3.5–5.5)
PROT SERPL-MCNC: 6.6 G/DL (ref 5.7–8.2)
RBC # BLD AUTO: 4.78 10*6/MM3 (ref 3.89–5.14)
SODIUM BLD-SCNC: 136 MMOL/L (ref 132–146)
WBC NRBC COR # BLD: 13.51 10*3/MM3 (ref 3.5–10.8)

## 2017-09-09 PROCEDURE — 99024 POSTOP FOLLOW-UP VISIT: CPT | Performed by: SURGERY

## 2017-09-09 PROCEDURE — 0 DIATRIZOATE MEGLUMINE & SODIUM PER 1 ML: Performed by: SURGERY

## 2017-09-09 PROCEDURE — 25010000002 MORPHINE PER 10 MG: Performed by: SURGERY

## 2017-09-09 PROCEDURE — 25010000002 NA FERRIC GLUC CPLX PER 12.5 MG: Performed by: SURGERY

## 2017-09-09 PROCEDURE — 94799 UNLISTED PULMONARY SVC/PX: CPT

## 2017-09-09 PROCEDURE — 25010000003 CEFAZOLIN IN DEXTROSE 2-4 GM/100ML-% SOLUTION: Performed by: SURGERY

## 2017-09-09 PROCEDURE — 80053 COMPREHEN METABOLIC PANEL: CPT | Performed by: SURGERY

## 2017-09-09 PROCEDURE — 74241: CPT

## 2017-09-09 PROCEDURE — 85025 COMPLETE CBC W/AUTO DIFF WBC: CPT | Performed by: SURGERY

## 2017-09-09 PROCEDURE — 83540 ASSAY OF IRON: CPT | Performed by: SURGERY

## 2017-09-09 PROCEDURE — 25010000002 METOCLOPRAMIDE PER 10 MG: Performed by: SURGERY

## 2017-09-09 PROCEDURE — 25810000003 POTASSIUM CHLORIDE PER 2 MEQ: Performed by: SURGERY

## 2017-09-09 PROCEDURE — 25010000002 THIAMINE PER 100 MG: Performed by: SURGERY

## 2017-09-09 PROCEDURE — 25010000002 CYANOCOBALAMIN PER 1000 MCG: Performed by: SURGERY

## 2017-09-09 PROCEDURE — 25010000002 PYRIDOXINE PER 100 MG: Performed by: SURGERY

## 2017-09-09 RX ORDER — PROMETHAZINE HYDROCHLORIDE 12.5 MG/1
12.5 TABLET ORAL EVERY 6 HOURS PRN
Qty: 20 TABLET | Refills: 0 | Status: SHIPPED | OUTPATIENT
Start: 2017-09-09 | End: 2017-09-14 | Stop reason: SDUPTHER

## 2017-09-09 RX ORDER — OMEPRAZOLE 20 MG/1
20 CAPSULE, DELAYED RELEASE ORAL DAILY
Qty: 60 CAPSULE | Refills: 1 | Status: SHIPPED | OUTPATIENT
Start: 2017-09-09 | End: 2017-12-15

## 2017-09-09 RX ORDER — ONDANSETRON 4 MG/1
4 TABLET, ORALLY DISINTEGRATING ORAL EVERY 8 HOURS PRN
Qty: 20 TABLET | Refills: 0 | Status: SHIPPED | OUTPATIENT
Start: 2017-09-09 | End: 2018-03-16

## 2017-09-09 RX ORDER — HYDROCODONE BITARTRATE AND ACETAMINOPHEN 7.5; 325 MG/1; MG/1
1 TABLET ORAL EVERY 6 HOURS PRN
Qty: 30 TABLET | Refills: 0 | Status: SHIPPED | OUTPATIENT
Start: 2017-09-09 | End: 2017-10-04

## 2017-09-09 RX ADMIN — LOSARTAN POTASSIUM 25 MG: 25 TABLET, FILM COATED ORAL at 08:01

## 2017-09-09 RX ADMIN — SODIUM CHLORIDE 125 MG: 9 INJECTION, SOLUTION INTRAVENOUS at 14:05

## 2017-09-09 RX ADMIN — POTASSIUM CHLORIDE AND SODIUM CHLORIDE 125 ML/HR: 450; 150 INJECTION, SOLUTION INTRAVENOUS at 10:52

## 2017-09-09 RX ADMIN — BUPROPION HYDROCHLORIDE 300 MG: 150 TABLET, FILM COATED, EXTENDED RELEASE ORAL at 08:00

## 2017-09-09 RX ADMIN — ONDANSETRON 4 MG: 4 TABLET, FILM COATED ORAL at 14:08

## 2017-09-09 RX ADMIN — MORPHINE SULFATE 4 MG: 4 INJECTION, SOLUTION INTRAMUSCULAR; INTRAVENOUS at 05:21

## 2017-09-09 RX ADMIN — CYANOCOBALAMIN 1000 MCG: 1000 INJECTION, SOLUTION INTRAMUSCULAR; SUBCUTANEOUS at 07:49

## 2017-09-09 RX ADMIN — ONDANSETRON 4 MG: 4 TABLET, FILM COATED ORAL at 07:59

## 2017-09-09 RX ADMIN — Medication 30 ML: at 10:13

## 2017-09-09 RX ADMIN — FOLIC ACID 250 ML/HR: 5 INJECTION, SOLUTION INTRAMUSCULAR; INTRAVENOUS; SUBCUTANEOUS at 07:48

## 2017-09-09 RX ADMIN — HYDROCODONE BITARTRATE AND ACETAMINOPHEN 1 TABLET: 7.5; 325 TABLET ORAL at 10:55

## 2017-09-09 RX ADMIN — PANTOPRAZOLE SODIUM 40 MG: 40 INJECTION, POWDER, FOR SOLUTION INTRAVENOUS at 05:21

## 2017-09-09 RX ADMIN — HYDROMORPHONE HYDROCHLORIDE 2 MG: 2 TABLET ORAL at 07:59

## 2017-09-09 RX ADMIN — HYDROMORPHONE HYDROCHLORIDE 2 MG: 2 TABLET ORAL at 14:08

## 2017-09-09 RX ADMIN — FLUOXETINE 40 MG: 20 CAPSULE ORAL at 08:01

## 2017-09-09 RX ADMIN — PHENOL 2 SPRAY: 1.5 LIQUID ORAL at 07:59

## 2017-09-09 RX ADMIN — METOCLOPRAMIDE 10 MG: 5 INJECTION, SOLUTION INTRAMUSCULAR; INTRAVENOUS at 10:56

## 2017-09-09 RX ADMIN — POTASSIUM CHLORIDE AND SODIUM CHLORIDE 125 ML/HR: 450; 150 INJECTION, SOLUTION INTRAVENOUS at 11:38

## 2017-09-09 RX ADMIN — CEFAZOLIN SODIUM 2 G: 2 INJECTION, SOLUTION INTRAVENOUS at 05:21

## 2017-09-09 NOTE — NURSING NOTE
Patient watched educational discharge video. Denies questions or concerns. Written materials given.

## 2017-09-09 NOTE — PROGRESS NOTES
"Bariatric Surgery Progress Note:      Chief Complaint:  POD #1 s/p LSG/HHR (previous AGB).    Subjective     Interval History:  Doing well.  No complaints.  Pain controlled.  Mild nausea but has tolerated stage 1 diet without vomiting and with only oral nausea meds today.  No fevers.  Ambulating.  Voiding.  IS 2000, reported.    Objective     Vital Signs  Blood pressure 136/94, pulse 83, temperature 98 °F (36.7 °C), temperature source Oral, resp. rate 18, height 68\" (172.7 cm), weight 274 lb 8 oz (125 kg), last menstrual period 09/03/2017, SpO2 95 %, not currently breastfeeding.      Intake/Output Summary (Last 24 hours) at 09/09/17 1529  Last data filed at 09/09/17 1405   Gross per 24 hour   Intake          1998.33 ml   Output              175 ml   Net          1823.33 ml       Physical Exam:  General: Alert, NAD  Lungs: Clear  Heart: RRR  Abdomen: soft, appropriate, incisions clean and dry, scant BS  Extremities: warm, (+) SCDs       Labs:  Lab Results (last 24 hours)     Procedure Component Value Units Date/Time    Tissue Pathology Exam [[id number]] Collected:  09/08/17 1510    Specimen:  Tissue from Stomach Updated:  09/08/17 1542    CBC & Differential [[id number]] Collected:  09/09/17 0910    Specimen:  Blood Updated:  09/09/17 0922    Narrative:       The following orders were created for panel order CBC & Differential.  Procedure                               Abnormality         Status                     ---------                               -----------         ------                     CBC Auto Differential[[id number]]        Abnormal            Final result                 Please view results for these tests on the individual orders.    CBC Auto Differential [[id number]]  (Abnormal) Collected:  09/09/17 0910    Specimen:  Blood Updated:  09/09/17 0922     WBC 13.51 (H) 10*3/mm3      RBC 4.78 10*6/mm3      Hemoglobin 13.4 g/dL      Hematocrit 39.4 %      MCV 82.4 fL      MCH 28.0 pg      MCHC 34.0 g/dL      " RDW 13.1 %      RDW-SD 39.9 fl      MPV 9.0 fL      Platelets 271 10*3/mm3      Neutrophil % 83.1 (H) %      Lymphocyte % 8.8 (L) %      Monocyte % 7.8 %      Eosinophil % 0.0 %      Basophil % 0.1 %      Immature Grans % 0.2 %      Neutrophils, Absolute 11.23 (H) 10*3/mm3      Lymphocytes, Absolute 1.19 10*3/mm3      Monocytes, Absolute 1.05 (H) 10*3/mm3      Eosinophils, Absolute 0.00 10*3/mm3      Basophils, Absolute 0.01 10*3/mm3      Immature Grans, Absolute 0.03 10*3/mm3     Comprehensive Metabolic Panel [[id number]]  (Abnormal) Collected:  09/09/17 0910    Specimen:  Blood Updated:  09/09/17 0955     Glucose 102 (H) mg/dL      BUN 10 mg/dL      Creatinine 0.80 mg/dL      Sodium 136 mmol/L      Potassium 4.1 mmol/L      Chloride 105 mmol/L      CO2 28.0 mmol/L      Calcium 8.8 mg/dL      Total Protein 6.6 g/dL      Albumin 3.80 g/dL      ALT (SGPT) 40 U/L      AST (SGOT) 32 U/L      Alkaline Phosphatase 65 U/L      Total Bilirubin 0.4 mg/dL      eGFR Non African Amer 80 mL/min/1.73      Globulin 2.8 gm/dL      A/G Ratio 1.4 (L) g/dL      BUN/Creatinine Ratio 12.5     Anion Gap 3.0 mmol/L     Narrative:       [employer] Guidelines    Stage     Description        GFR  1         Normal or High     90+  2         Mild decrease      60-89  3         Moderate decrease  30-59  4         Severe decrease    15-29  5         Kidney failure     <15    Iron [[id number]]  (Abnormal) Collected:  09/09/17 0910    Specimen:  Blood Updated:  09/09/17 0955     Iron 43 (L) mcg/dL             Assessment/Plan     POD # 1 s/p LSG/HHR (previous AGB).    Doing well.  UGI normal post-sleeve, images and report reviewed.  IV iron given for low Fe.  Patient feels well and has met discharge criteria.  Will discharge home with follow up in 1 week with PA.  Rx given for Norco, zofran, phenergan, omeprazole.   She is instructed to hold her home Hctz.  Discharge instructions reviewed with patient and all questions answered.         09/09/17  3:29 PM  Greer Sales MD

## 2017-09-09 NOTE — PLAN OF CARE
Problem: Patient Care Overview (Adult)  Goal: Plan of Care Review  Outcome: Ongoing (interventions implemented as appropriate)    09/09/17 0542   Coping/Psychosocial Response Interventions   Plan Of Care Reviewed With patient   Patient Care Overview   Progress improving   Outcome Evaluation   Outcome Summary/Follow up Plan pt ambulating in vázquez and using IS.        Goal: Adult Individualization and Mutuality  Outcome: Ongoing (interventions implemented as appropriate)  Goal: Discharge Needs Assessment  Outcome: Ongoing (interventions implemented as appropriate)    Problem: Bariatric Surgery (Open/Laparoscopic) (Adult,Pediatric)  Goal: Signs and Symptoms of Listed Potential Problems Will be Absent or Manageable (Bariatric Surgery)  Outcome: Ongoing (interventions implemented as appropriate)

## 2017-09-14 ENCOUNTER — OFFICE VISIT (OUTPATIENT)
Dept: BARIATRICS/WEIGHT MGMT | Facility: CLINIC | Age: 37
End: 2017-09-14

## 2017-09-14 VITALS
BODY MASS INDEX: 39.78 KG/M2 | DIASTOLIC BLOOD PRESSURE: 86 MMHG | SYSTOLIC BLOOD PRESSURE: 131 MMHG | OXYGEN SATURATION: 99 % | HEIGHT: 68 IN | WEIGHT: 262.5 LBS | RESPIRATION RATE: 22 BRPM | HEART RATE: 86 BPM | TEMPERATURE: 97.9 F

## 2017-09-14 DIAGNOSIS — I10 ESSENTIAL HYPERTENSION: ICD-10-CM

## 2017-09-14 DIAGNOSIS — E66.9 OBESITY, CLASS II, BMI 35-39.9: ICD-10-CM

## 2017-09-14 DIAGNOSIS — Z98.84 S/P BARIATRIC SURGERY: ICD-10-CM

## 2017-09-14 DIAGNOSIS — R53.83 OTHER FATIGUE: Primary | ICD-10-CM

## 2017-09-14 PROCEDURE — 99024 POSTOP FOLLOW-UP VISIT: CPT | Performed by: SURGERY

## 2017-09-14 RX ORDER — PROMETHAZINE HYDROCHLORIDE 12.5 MG/1
12.5 TABLET ORAL EVERY 6 HOURS PRN
Qty: 20 TABLET | Refills: 1 | Status: SHIPPED | OUTPATIENT
Start: 2017-09-14 | End: 2017-09-14 | Stop reason: SDUPTHER

## 2017-09-14 RX ORDER — PROMETHAZINE HYDROCHLORIDE 12.5 MG/1
12.5 TABLET ORAL EVERY 6 HOURS PRN
Qty: 20 TABLET | Refills: 1 | Status: SHIPPED | OUTPATIENT
Start: 2017-09-14 | End: 2017-10-04

## 2017-09-14 RX ORDER — URSODIOL 300 MG/1
300 CAPSULE ORAL 2 TIMES DAILY
Qty: 60 CAPSULE | Refills: 5 | Status: SHIPPED | OUTPATIENT
Start: 2017-09-14 | End: 2017-10-14

## 2017-09-14 RX ORDER — CYCLOBENZAPRINE HCL 5 MG
5 TABLET ORAL 3 TIMES DAILY PRN
Qty: 30 TABLET | Refills: 1 | Status: SHIPPED | OUTPATIENT
Start: 2017-09-14 | End: 2017-12-15

## 2017-09-14 NOTE — PROGRESS NOTES
[facility] Bariatric Surgery  [address]  [facility] [id number] [phone]      Patient Name:  Lindsey Sprague.  :  1980      Date of Visit: 2017      Reason for Visit:  POD #6    HPI:  Lindsey Sprague is a 37 y.o. female s/p 17 LSG with GDW (previous AGB)    Doing well.  No major issues/concerns. Complains of pain on the right side and sometimes entire abdomen when she uses her rectus muscles.  Denies dysphagia, nausea, vomiting, pulmonary issues and fevers.  Tolerating diet progression - on stage 1.  Getting 70-88g prot/day.  Drinking 55-65 fluid oz/day.  Taking MVI, B12, Vit D, iron and Biotin (in a bariatric vitamin pack--evaluated this and not enough).  On Omeprazole .  Holding ASA , NSAIDs , Tramadol, Hormones, Diuretics  and Steroids.  Ambulating around house.  No BM yet, has tried miralax twice and Equate brand laxative daily x 3 days to no avail.  Before surgery, only had weekly BM.     Presurgery weight: 274 pounds.  Today's weight is 262 lb 8 oz (119 kg) pounds, today's  Body mass index is 39.91 kg/(m^2)., and her weight loss since surgery is 12 pounds.       Past Medical History:   Diagnosis Date   • Anxiety    • Depression    • Dyspepsia     Denies GB sx's.  serum h. pyl neg   • Dyspnea on exertion    • Elevated HDL     81   • Elevated LDL cholesterol level     102   • Fatigue    • Heart murmur     heard on exam today, no hx, asx   • Heartburn     infrequent, no meds, poss recurrent HH on EGD 2017 GDW   • Hypertension    • Menstrual irregularity    • Morbid obesity    • Polycystic ovarian syndrome    • Wears eyeglasses      Past Surgical History:   Procedure Laterality Date   • ENDOSCOPY N/A 2017    Procedure: ESOPHAGOGASTRODUODENOSCOPY;  Surgeon: Karthik Campuzano MD;  Location: [facility];  Service:    • GASTRIC BANDING REMOVAL  2017    s/p AGBR 17 by GDW for chronic pain/nausea/pouch enlargement   • GASTRIC SLEEVE LAPAROSCOPIC  N/A 9/8/2017    Procedure: GASTRIC SLEEVE LAPAROSCOPIC;  Surgeon: Karthik Campuzano MD;  Location: [facility];  Service:    • LAPAROSCOPIC GASTRIC BANDING  2011    s/p LAGB APS w/ HHR 3/2011 by GDW   • TONSILLECTOMY  1994     Outpatient Prescriptions Marked as Taking for the 9/14/17 encounter (Office Visit) with Greer Sales MD   Medication Sig Dispense Refill   • buPROPion XL (WELLBUTRIN XL) 300 MG 24 hr tablet Take 300 mg by mouth Daily.     • busPIRone (BUSPAR) 10 MG tablet Take 10 mg by mouth As Needed (anxious).     • FLUoxetine (PROzac) 40 MG capsule Take 40 mg by mouth Daily.     • HYDROcodone-acetaminophen (NORCO) 7.5-325 MG per tablet Take 1 tablet by mouth Every 6 (Six) Hours As Needed for Moderate Pain . 30 tablet 0   • levonorgestrel (MIRENA) 20 MCG/24HR IUD 1 each by Intrauterine route 1 (One) Time.     • losartan (COZAAR) 25 MG tablet Take 25 mg by mouth Daily.     • melatonin 5 MG tablet tablet Take 5 mg by mouth Every Night.     • omeprazole (PRILOSEC) 20 MG capsule Take 1 capsule by mouth Daily. 60 capsule 1   • ondansetron ODT (ZOFRAN ODT) 4 MG disintegrating tablet Take 1 tablet by mouth Every 8 (Eight) Hours As Needed for Nausea or Vomiting. 20 tablet 0   • promethazine (PHENERGAN) 12.5 MG tablet Take 1 tablet by mouth Every 6 (Six) Hours As Needed for Nausea or Vomiting. 20 tablet 0   • traZODone (DESYREL) 100 MG tablet Take 100 mg by mouth Every Night.       No Known Allergies    Social History     Social History   • Marital status:      Spouse name: N/A   • Number of children: N/A   • Years of education: N/A     Occupational History   • Not on file.     Social History Main Topics   • Smoking status: Never Smoker   • Smokeless tobacco: Never Used   • Alcohol use No   • Drug use: No   • Sexual activity: Not on file     Other Topics Concern   • Not on file     Social History Narrative    .  Lives in [facility] w/  and 3 children.   at [facility].         /86 (BP Location: Right arm, Patient Position: Sitting, Cuff Size: Large Adult)  Pulse 86  Temp 97.9 °F (36.6 °C) (Temporal Artery )   Resp 22  Ht 68\" (172.7 cm)  Wt 262 lb 8 oz (119 kg)  LMP 09/03/2017  SpO2 99%  BMI 39.91 kg/m2  Physical Exam   Constitutional: She is oriented to person, place, and time. She appears well-developed and well-nourished. No distress.   HENT:   Head: Normocephalic and atraumatic.   Mouth/Throat: No oropharyngeal exudate.   Eyes: EOM are normal. Pupils are equal, round, and reactive to light. No scleral icterus.   Pulmonary/Chest: Effort normal. No respiratory distress.   Abdominal: Soft. She exhibits no distension and no mass. There is no tenderness. No hernia.   Well-healing incisions with some bruising.   Neurological: She is alert and oriented to person, place, and time. No cranial nerve deficit.   Skin: Skin is warm and dry. No rash noted.   Psychiatric: She has a normal mood and affect. Her behavior is normal. Judgment and thought content normal.         Assessment:   POD # 6 s/p 9/8/17 LSG with GDW      Plan:  Doing well. Continue to advance diet per manual.  Increase protein intake to 100g/day.  Actigall Rx given.  Increase exercise/activity as tolerated.  Reviewed lifting restrictions, nothing >25 lbs x 2 more weeks.  Continue vitamins.  Continue PPI.  Continue to avoid ASA/NSAIDs/Steroids x 6 weeks postop.      For constipation: miralax, MOM, colace, sparing dulcolax/senna.    Try flexeril for rectus muscle pain, likely from stitch.  Rx also given for phenergan.    Call w/ problems/concerns.    The patient was instructed to follow up in 3 weeks, sooner if needed.     Greer Sales MD    "

## 2017-10-04 ENCOUNTER — OFFICE VISIT (OUTPATIENT)
Dept: BARIATRICS/WEIGHT MGMT | Facility: CLINIC | Age: 37
End: 2017-10-04

## 2017-10-04 VITALS
TEMPERATURE: 98.1 F | DIASTOLIC BLOOD PRESSURE: 81 MMHG | OXYGEN SATURATION: 99 % | HEIGHT: 68 IN | RESPIRATION RATE: 18 BRPM | HEART RATE: 73 BPM | SYSTOLIC BLOOD PRESSURE: 135 MMHG | WEIGHT: 252 LBS | BODY MASS INDEX: 38.19 KG/M2

## 2017-10-04 DIAGNOSIS — R79.0 ABNORMAL BLOOD LEVEL OF IRON: ICD-10-CM

## 2017-10-04 DIAGNOSIS — E55.9 VITAMIN D DEFICIENCY: ICD-10-CM

## 2017-10-04 DIAGNOSIS — E66.9 OBESITY, CLASS II, BMI 35-39.9: ICD-10-CM

## 2017-10-04 DIAGNOSIS — Z98.84 S/P BARIATRIC SURGERY: ICD-10-CM

## 2017-10-04 DIAGNOSIS — I10 ESSENTIAL HYPERTENSION: ICD-10-CM

## 2017-10-04 DIAGNOSIS — R10.13 DYSPEPSIA: Primary | ICD-10-CM

## 2017-10-04 DIAGNOSIS — R53.83 FATIGUE, UNSPECIFIED TYPE: ICD-10-CM

## 2017-10-04 PROBLEM — E66.01 OBESITY, CLASS III, BMI 40-49.9 (MORBID OBESITY) (HCC): Status: RESOLVED | Noted: 2017-08-23 | Resolved: 2017-10-04

## 2017-10-04 PROCEDURE — 99024 POSTOP FOLLOW-UP VISIT: CPT | Performed by: PHYSICIAN ASSISTANT

## 2017-10-04 NOTE — PROGRESS NOTES
"[facility] Bariatric Surgery  [address]  [facility] [address] [phone]      Patient Name:  Lindsey Sprague.  :  1980      Date of Visit: 10/4/2017      Reason for Visit:  POD # 26    HPI:  Lindsey Sprague is a 37 y.o. female s/p LSG with GDW (previous AGB) on 17    Doing \"pretty good.\"  Tolerating diet progression - on stage 4 - eating fish, vegetables, etc.  Still doing protein shakes but getting only 60g prot/day - says she doesn't like the taste of her protein shakes so has ordered some more to try.  No other issues/concerns.  Taking recommended vitamins.  On Omeprazole + Actigall.  Still holding HCTZ but wants to discuss w/ PCP about resuming.  Exercising 2 days/week.     Presurgery weight: 274 pounds.  Today's weight is 252 lb (114 kg) pounds, today's  Body mass index is 38.32 kg/(m^2)., and her weight loss since surgery is 22 pounds.       Past Medical History:   Diagnosis Date   • Anxiety    • Depression    • Dyspepsia     Denies GB sx's.  serum h. pyl neg   • Dyspnea on exertion    • Elevated HDL     81   • Elevated LDL cholesterol level     102   • Fatigue    • Heart murmur     heard on exam today, no hx, asx   • Heartburn     infrequent, no meds, poss recurrent HH on EGD 2017 GDW   • Hypertension    • Menstrual irregularity    • Morbid obesity    • Polycystic ovarian syndrome    • Wears eyeglasses      Past Surgical History:   Procedure Laterality Date   • ENDOSCOPY N/A 2017    Procedure: ESOPHAGOGASTRODUODENOSCOPY;  Surgeon: Karthik Campuzano MD;  Location:  [facility];  Service:    • GASTRIC BANDING REMOVAL      s/p AGBR 17 by [facility] for chronic pain/nausea/pouch enlargement   • GASTRIC SLEEVE LAPAROSCOPIC N/A 2017    Procedure: GASTRIC SLEEVE LAPAROSCOPIC;  Surgeon: Karthik Campuzano MD;  Location:  [facility];  Service:    • LAPAROSCOPIC GASTRIC BANDING      s/p LAGB APS w/ HHR 3/2011 by [facility]   • TONSILLECTOMY   " "    Outpatient Prescriptions Marked as Taking for the 10/4/17 encounter (Office Visit) with JEAN Miles   Medication Sig Dispense Refill   • buPROPion XL (WELLBUTRIN XL) 300 MG 24 hr tablet Take 300 mg by mouth Daily.     • busPIRone (BUSPAR) 10 MG tablet Take 10 mg by mouth As Needed (anxious).     • cyclobenzaprine (FLEXERIL) 5 MG tablet Take 1 tablet by mouth 3 (Three) Times a Day As Needed for Muscle Spasms. 30 tablet 1   • FLUoxetine (PROzac) 40 MG capsule Take 40 mg by mouth Daily.     • levonorgestrel (MIRENA) 20 MCG/24HR IUD 1 each by Intrauterine route 1 (One) Time.     • losartan (COZAAR) 25 MG tablet Take 25 mg by mouth Daily.     • melatonin 5 MG tablet tablet Take 5 mg by mouth Every Night.     • omeprazole (PRILOSEC) 20 MG capsule Take 1 capsule by mouth Daily. 60 capsule 1   • ondansetron ODT (ZOFRAN ODT) 4 MG disintegrating tablet Take 1 tablet by mouth Every 8 (Eight) Hours As Needed for Nausea or Vomiting. 20 tablet 0   • traZODone (DESYREL) 100 MG tablet Take 100 mg by mouth Every Night.     • ursodiol (ACTIGALL) 300 MG capsule Take 1 capsule by mouth 2 (Two) Times a Day for 30 days. 60 capsule 5     No Known Allergies    Social History     Social History   • Marital status:      Spouse name: N/A   • Number of children: N/A   • Years of education: N/A     Occupational History   • Not on file.     Social History Main Topics   • Smoking status: Never Smoker   • Smokeless tobacco: Never Used   • Alcohol use No   • Drug use: No   • Sexual activity: Not on file     Other Topics Concern   • Not on file     Social History Narrative    .  Lives in [facility]/  and 3 children.   at [facility].         /81 (BP Location: Left arm, Patient Position: Sitting, Cuff Size: Large Adult)  Pulse 73  Temp 98.1 °F (36.7 °C) (Temporal Artery )   Resp 18  Ht 68\" (172.7 cm)  Wt 252 lb (114 kg)  SpO2 99%  BMI 38.32 kg/m2  Physical Exam "   Constitutional: She appears well-developed and well-nourished.   HENT:   Mouth/Throat: Oropharynx is clear and moist.   Eyes: No scleral icterus.   Pulmonary/Chest: Effort normal. No respiratory distress.   Abdominal: Soft. She exhibits no distension and no mass. There is no tenderness. No hernia.   Incisions healing well   Musculoskeletal: Normal range of motion.   Neurological: She is alert.   Skin: Skin is warm and dry. No rash noted.   Psychiatric: She has a normal mood and affect. Judgment normal.         Assessment:   POD # 26 s/p LSG with GDW (previous AGB) on 9/8/17      Plan:  Doing well. Continue to advance diet per manual.  Increase protein intake to 100g/day.  Increase exercise/activity as tolerated. Routine labs ordered.  Continue vitamins w/ adjustments pending lab results.  Continue PPI.  Continue to avoid ASA/NSAIDs/Steroids x 6 weeks postop.  Call w/ problems/concerns.      The patient was instructed to follow up in 2 months, sooner if needed.     JEAN Miles

## 2017-10-08 LAB
25(OH)D3+25(OH)D2 SERPL-MCNC: 42.8 NG/ML (ref 30–100)
A-TOCOPHEROL VIT E SERPL-MCNC: 8.9 MG/L (ref 5.3–16.8)
ALBUMIN SERPL-MCNC: 4.3 G/DL (ref 3.5–5.5)
ALBUMIN/GLOB SERPL: 1.4 {RATIO} (ref 1.2–2.2)
ALP SERPL-CCNC: 83 IU/L (ref 39–117)
ALT SERPL-CCNC: 11 IU/L (ref 0–32)
AST SERPL-CCNC: 12 IU/L (ref 0–40)
BASOPHILS # BLD AUTO: 0 X10E3/UL (ref 0–0.2)
BASOPHILS NFR BLD AUTO: 1 %
BILIRUB SERPL-MCNC: 0.4 MG/DL (ref 0–1.2)
BUN SERPL-MCNC: 14 MG/DL (ref 6–20)
BUN/CREAT SERPL: 15 (ref 9–23)
CALCIUM SERPL-MCNC: 9.6 MG/DL (ref 8.7–10.2)
CHLORIDE SERPL-SCNC: 100 MMOL/L (ref 96–106)
CO2 SERPL-SCNC: 23 MMOL/L (ref 18–29)
CREAT SERPL-MCNC: 0.92 MG/DL (ref 0.57–1)
EOSINOPHIL # BLD AUTO: 0.3 X10E3/UL (ref 0–0.4)
EOSINOPHIL NFR BLD AUTO: 4 %
ERYTHROCYTE [DISTWIDTH] IN BLOOD BY AUTOMATED COUNT: 14.1 % (ref 12.3–15.4)
FERRITIN SERPL-MCNC: 119 NG/ML (ref 15–150)
FOLATE SERPL-MCNC: 7 NG/ML
GLOBULIN SER CALC-MCNC: 3 G/DL (ref 1.5–4.5)
GLUCOSE SERPL-MCNC: 94 MG/DL (ref 65–99)
HCT VFR BLD AUTO: 42.3 % (ref 34–46.6)
HGB BLD-MCNC: 14.6 G/DL (ref 11.1–15.9)
IMM GRANULOCYTES # BLD: 0 X10E3/UL (ref 0–0.1)
IMM GRANULOCYTES NFR BLD: 0 %
IRON SERPL-MCNC: 68 UG/DL (ref 27–159)
LYMPHOCYTES # BLD AUTO: 2.8 X10E3/UL (ref 0.7–3.1)
LYMPHOCYTES NFR BLD AUTO: 36 %
MAGNESIUM SERPL-MCNC: 1.9 MG/DL (ref 1.6–2.3)
MCH RBC QN AUTO: 28.1 PG (ref 26.6–33)
MCHC RBC AUTO-ENTMCNC: 34.5 G/DL (ref 31.5–35.7)
MCV RBC AUTO: 82 FL (ref 79–97)
METHYLMALONATE SERPL-SCNC: 144 NMOL/L (ref 0–378)
MONOCYTES # BLD AUTO: 0.4 X10E3/UL (ref 0.1–0.9)
MONOCYTES NFR BLD AUTO: 6 %
NEUTROPHILS # BLD AUTO: 4.2 X10E3/UL (ref 1.4–7)
NEUTROPHILS NFR BLD AUTO: 53 %
PHOSPHATE SERPL-MCNC: 2.9 MG/DL (ref 2.5–4.5)
PLATELET # BLD AUTO: 285 X10E3/UL (ref 150–379)
POTASSIUM SERPL-SCNC: 4.6 MMOL/L (ref 3.5–5.2)
PREALB SERPL-MCNC: 22 MG/DL (ref 14–35)
PROT SERPL-MCNC: 7.3 G/DL (ref 6–8.5)
PTH-INTACT SERPL-MCNC: 32 PG/ML (ref 15–65)
RBC # BLD AUTO: 5.19 X10E6/UL (ref 3.77–5.28)
SODIUM SERPL-SCNC: 141 MMOL/L (ref 134–144)
VIT A SERPL-MCNC: 43 UG/DL (ref 20–65)
VIT B1 BLD-SCNC: 156.1 NMOL/L (ref 66.5–200)
WBC # BLD AUTO: 7.7 X10E3/UL (ref 3.4–10.8)
ZINC SERPL-MCNC: 99 UG/DL (ref 56–134)

## 2017-12-15 ENCOUNTER — OFFICE VISIT (OUTPATIENT)
Dept: BARIATRICS/WEIGHT MGMT | Facility: CLINIC | Age: 37
End: 2017-12-15

## 2017-12-15 VITALS
SYSTOLIC BLOOD PRESSURE: 115 MMHG | HEART RATE: 79 BPM | OXYGEN SATURATION: 99 % | WEIGHT: 239.01 LBS | DIASTOLIC BLOOD PRESSURE: 82 MMHG | RESPIRATION RATE: 18 BRPM | HEIGHT: 68 IN | BODY MASS INDEX: 36.22 KG/M2 | TEMPERATURE: 97.4 F

## 2017-12-15 DIAGNOSIS — R53.83 FATIGUE, UNSPECIFIED TYPE: Primary | ICD-10-CM

## 2017-12-15 DIAGNOSIS — Z13.0 SCREENING, IRON DEFICIENCY ANEMIA: ICD-10-CM

## 2017-12-15 DIAGNOSIS — I10 ESSENTIAL HYPERTENSION: ICD-10-CM

## 2017-12-15 DIAGNOSIS — R79.0 ABNORMAL BLOOD LEVEL OF IRON: ICD-10-CM

## 2017-12-15 DIAGNOSIS — R10.13 DYSPEPSIA: ICD-10-CM

## 2017-12-15 DIAGNOSIS — Z13.21 MALNUTRITION SCREEN: ICD-10-CM

## 2017-12-15 DIAGNOSIS — E55.9 VITAMIN D DEFICIENCY: ICD-10-CM

## 2017-12-15 DIAGNOSIS — Z90.3 POSTGASTRECTOMY MALABSORPTION: ICD-10-CM

## 2017-12-15 DIAGNOSIS — K91.2 POSTGASTRECTOMY MALABSORPTION: ICD-10-CM

## 2017-12-15 PROCEDURE — 99214 OFFICE O/P EST MOD 30 MIN: CPT | Performed by: SURGERY

## 2017-12-15 NOTE — PROGRESS NOTES
[facility] Bariatric Surgery  [address]  [facility] [address]  [phone]        Patient Name:  Lindsey Sprague.  :  1980      Date of Visit: 12/15/2017      Reason for Visit:   3 months postop     HPI: Lindsey Sprague is a 37 y.o. female s/p LSG with GDW (previous AGB) on 17     Doing well.  No issues/concerns. Denies dysphagia, reflux, nausea, vomiting and abdominal pain.  Getting 50g prot/day.  Drinking 64 fluid oz/day.  1 month labs revealed no deficiencies. Taking Patches: MVI..  No longer taking Actigall b/c pill too big and nauseated.  Exercising: walks 2 days per week, 30 minutes, no strength training.     Presurgery weight: 279 pounds.  Today's weight is 108 kg (239 lb 0.2 oz) pounds, today's  Body mass index is 36.34 kg/(m^2)., and her weight loss since surgery is 40 pounds.      Past Medical History:   Diagnosis Date   • Anxiety    • Depression    • Dyspepsia     Denies GB sx's.  serum h. pyl neg   • Dyspnea on exertion    • Elevated HDL     81   • Elevated LDL cholesterol level     102   • Fatigue    • Heart murmur     heard on exam today, no hx, asx   • Heartburn     infrequent, no meds, poss recurrent HH on EGD 2017 [facility]   • Hypertension    • Menstrual irregularity    • Morbid obesity    • Polycystic ovarian syndrome    • Wears eyeglasses      Past Surgical History:   Procedure Laterality Date   • ENDOSCOPY N/A 2017    Procedure: ESOPHAGOGASTRODUODENOSCOPY;  Surgeon: Karthik Campuzano MD;  Location:  [facility];  Service:    • GASTRIC BANDING REMOVAL      s/p AGBR 17 by [facility] for chronic pain/nausea/pouch enlargement   • GASTRIC SLEEVE LAPAROSCOPIC N/A 2017    Procedure: GASTRIC SLEEVE LAPAROSCOPIC;  Surgeon: Karthik Campuzano MD;  Location:  [facility];  Service:    • LAPAROSCOPIC GASTRIC BANDING      s/p LAGB APS w/ HHR 3/2011 by [facility]   • TONSILLECTOMY       Outpatient Prescriptions Marked as Taking for the 12/15/17  "encounter (Office Visit) with Greer Sales MD   Medication Sig Dispense Refill   • buPROPion XL (WELLBUTRIN XL) 300 MG 24 hr tablet Take 300 mg by mouth Daily.     • busPIRone (BUSPAR) 10 MG tablet Take 10 mg by mouth As Needed (anxious).     • FLUoxetine (PROzac) 40 MG capsule Take 40 mg by mouth Daily.     • losartan (COZAAR) 25 MG tablet Take 25 mg by mouth Daily.     • melatonin 5 MG tablet tablet Take 5 mg by mouth Every Night.     • ondansetron ODT (ZOFRAN ODT) 4 MG disintegrating tablet Take 1 tablet by mouth Every 8 (Eight) Hours As Needed for Nausea or Vomiting. 20 tablet 0   • traZODone (DESYREL) 100 MG tablet Take 100 mg by mouth Every Night.         No Known Allergies    Social History     Social History   • Marital status:      Spouse name: N/A   • Number of children: N/A   • Years of education: N/A     Occupational History   • Not on file.     Social History Main Topics   • Smoking status: Never Smoker   • Smokeless tobacco: Never Used   • Alcohol use No   • Drug use: No   • Sexual activity: Not on file     Other Topics Concern   • Not on file     Social History Narrative    .  Lives in [facility] w/  and 3 children.   at [facility].         /82 (BP Location: Left arm, Patient Position: Sitting, Cuff Size: Large Adult)  Pulse 79  Temp 97.4 °F (36.3 °C) (Temporal Artery )   Resp 18  Ht 172.7 cm (68\")  Wt 108 kg (239 lb 0.2 oz)  SpO2 99%  BMI 36.34 kg/m2    Physical Exam   Constitutional: She appears well-developed and well-nourished. No distress.   HENT:   Head: Normocephalic and atraumatic.   Mouth/Throat: No oropharyngeal exudate.   Eyes: Conjunctivae are normal. Pupils are equal, round, and reactive to light. No scleral icterus.   Pulmonary/Chest: Effort normal. No respiratory distress.   Abdominal: Soft. She exhibits no distension.   Skin: Skin is warm and dry. No rash noted. No erythema.   Psychiatric: She has a normal " mood and affect. Her behavior is normal. Judgment and thought content normal.         Assessment:  3 months s/p LSG with GDW (previous AGB) on 9/8/17    ICD-10-CM ICD-9-CM   1. Fatigue, unspecified type R53.83 780.79   2. Dyspepsia R10.13 536.8   3. Essential hypertension I10 401.9   4. Vitamin D deficiency E55.9 268.9   5. Abnormal blood level of iron R79.0 790.6   6. Malnutrition screen Z13.21 V77.2   7. Postgastrectomy malabsorption K91.2 579.3    Z90.3    8. Screening, iron deficiency anemia Z13.0 V78.0         Plan:  Doing well. Continue w/ good food choices and healthy habits.  Strive for protein >70g/day.  Continue routine exercise: try to walk 5 days per week.  Routine bariatric labs ordered.  Continue vitamins w/ adjustments pending lab results.  Call w/ problems/concerns.     The patient was instructed to follow up in 3 months, sooner if needed.    note: approx 15 of the 25 minute visit was spent counseling on nutrition and necessary dietary/lifestyle modifications.    Greer Sales MD

## 2018-02-21 DIAGNOSIS — E55.9 VITAMIN D DEFICIENCY: ICD-10-CM

## 2018-02-21 DIAGNOSIS — E51.9 VITAMIN B1 DEFICIENCY: ICD-10-CM

## 2018-02-21 DIAGNOSIS — E66.01 OBESITY, CLASS III, BMI 40-49.9 (MORBID OBESITY) (HCC): ICD-10-CM

## 2018-02-21 DIAGNOSIS — D52.9 ANEMIA DUE TO FOLIC ACID DEFICIENCY, UNSPECIFIED DEFICIENCY TYPE: ICD-10-CM

## 2018-02-21 DIAGNOSIS — R79.89 LOW SERUM PREALBUMIN: ICD-10-CM

## 2018-02-21 DIAGNOSIS — E61.1 IRON DEFICIENCY: ICD-10-CM

## 2018-02-21 DIAGNOSIS — R06.00 DYSPNEA, UNSPECIFIED TYPE: ICD-10-CM

## 2018-02-21 DIAGNOSIS — R53.83 FATIGUE, UNSPECIFIED TYPE: Primary | ICD-10-CM

## 2018-02-21 DIAGNOSIS — R53.83 FATIGUE, UNSPECIFIED TYPE: ICD-10-CM

## 2018-03-05 RX ORDER — ERGOCALCIFEROL 1.25 MG/1
50000 CAPSULE ORAL WEEKLY
Qty: 12 CAPSULE | Refills: 0 | Status: SHIPPED | OUTPATIENT
Start: 2018-03-05 | End: 2018-04-18 | Stop reason: SDUPTHER

## 2018-03-16 ENCOUNTER — OFFICE VISIT (OUTPATIENT)
Dept: BARIATRICS/WEIGHT MGMT | Facility: CLINIC | Age: 38
End: 2018-03-16

## 2018-03-16 VITALS
HEART RATE: 59 BPM | SYSTOLIC BLOOD PRESSURE: 130 MMHG | BODY MASS INDEX: 34.56 KG/M2 | WEIGHT: 228 LBS | DIASTOLIC BLOOD PRESSURE: 90 MMHG | HEIGHT: 68 IN | RESPIRATION RATE: 18 BRPM | OXYGEN SATURATION: 99 % | TEMPERATURE: 97.9 F

## 2018-03-16 DIAGNOSIS — E66.9 OBESITY, CLASS I, BMI 30-34.9: ICD-10-CM

## 2018-03-16 DIAGNOSIS — Z98.84 STATUS POST BARIATRIC SURGERY: Primary | ICD-10-CM

## 2018-03-16 PROCEDURE — 99214 OFFICE O/P EST MOD 30 MIN: CPT | Performed by: PHYSICIAN ASSISTANT

## 2018-03-16 NOTE — PROGRESS NOTES
[facility] Bariatric Surgery  [address]  [facility] [address] [phone]        Patient Name:  Lindsey Sprague.  :  1980      Date of Visit: 3/16/2018      Reason for Visit:   6 months postop      HPI: Lindsey Sprague is a 37 y.o. female s/p LSG with GDW (previous AGB) on 17     Doing well. She is concerned that she should be losing more weight than she has, thinking goal of <200lb. She is ready to step up and try harder. She has noted a couple episodes of abdominal pain, mostly upper/ epigastric/ LUQ, noted after eating, no associated nausea or vomiting, not really painful but enough to catch her attention.    No other issues/concerns. Denies dysphagia, reflux, nausea and vomiting.  Getting 50-60g prot/day, occasional protein shake. Eating lunch and dinner. Occ shake or yogurt for breakfast.  Tolerates all food welll, eats pork, chicken, meat balls. Still has a sweet tooth.  Drinking 64 fluid oz/day, coffee, water 40-50, shake.  3 month labs revealed low Vit D, rx for weekly D sent, labs were just drawn 3/1/18.  Taking Patches: MVI.  No reflux, not on antacids. Not exercising, active but no regular routine.     Presurgery weight: 279 pounds.  Today's weight is 103 kg (228 lb) pounds, today's  Body mass index is 34.67 kg/m²., and her weight loss since surgery is 51 pounds.      Past Medical History:   Diagnosis Date   • Anxiety    • Depression    • Dyspepsia     Denies GB sx's.  serum h. pyl neg   • Dyspnea on exertion    • Elevated HDL     81   • Elevated LDL cholesterol level     102   • Fatigue    • Heart murmur     heard on exam today, no hx, asx   • Heartburn     infrequent, no meds, poss recurrent HH on EGD 2017 GDW   • Hypertension    • Menstrual irregularity    • Morbid obesity    • Polycystic ovarian syndrome    • Wears eyeglasses      Past Surgical History:   Procedure Laterality Date   • ENDOSCOPY N/A 2017    Procedure:  "ESOPHAGOGASTRODUODENOSCOPY;  Surgeon: Karthik Campuzano MD;  Location:  [facility];  Service:    • GASTRIC BANDING REMOVAL  2017    s/p AGBR 2/6/17 by GDW for chronic pain/nausea/pouch enlargement   • GASTRIC SLEEVE LAPAROSCOPIC N/A 9/8/2017    Procedure: GASTRIC SLEEVE LAPAROSCOPIC;  Surgeon: Karthik Campuazno MD;  Location:  [facility];  Service:    • LAPAROSCOPIC GASTRIC BANDING  2011    s/p LAGB APS w/ HHR 3/2011 by GDW   • TONSILLECTOMY  1994     Outpatient Prescriptions Marked as Taking for the 3/16/18 encounter (Office Visit) with Beatriz Flores PA-C   Medication Sig Dispense Refill   • buPROPion XL (WELLBUTRIN XL) 300 MG 24 hr tablet Take 300 mg by mouth Daily.     • busPIRone (BUSPAR) 10 MG tablet Take 10 mg by mouth As Needed (anxious).     • melatonin 5 MG tablet tablet Take 5 mg by mouth Every Night.     • traZODone (DESYREL) 100 MG tablet Take 100 mg by mouth Every Night.     • vitamin D (ERGOCALCIFEROL) 97951 units capsule capsule Take 1 capsule by mouth 1 (One) Time Per Week. 12 capsule 0       No Known Allergies    Social History     Social History   • Marital status:      Spouse name: N/A   • Number of children: N/A   • Years of education: N/A     Occupational History   • Not on file.     Social History Main Topics   • Smoking status: Never Smoker   • Smokeless tobacco: Never Used   • Alcohol use No   • Drug use: No   • Sexual activity: Not on file     Other Topics Concern   • Not on file     Social History Narrative    .  Lives in [facility] w/  and 3 children.   at [facility].         /90 (BP Location: Left arm, Patient Position: Sitting, Cuff Size: Large Adult)   Pulse 59   Temp 97.9 °F (36.6 °C) (Temporal Artery )   Resp 18   Ht 172.7 cm (68\")   Wt 103 kg (228 lb)   SpO2 99%   BMI 34.67 kg/m²     Physical Exam   Constitutional: She is oriented to person, place, and time. She appears well-developed and well-nourished.   HENT: "   Head: Normocephalic and atraumatic.   Cardiovascular: Normal rate, regular rhythm and normal heart sounds.    Pulmonary/Chest: Effort normal and breath sounds normal.   Abdominal: Soft. Bowel sounds are normal. There is tenderness (RUQ diffuse).   Incisions healing well   Neurological: She is alert and oriented to person, place, and time.   Skin: Skin is warm and dry.   Psychiatric: She has a normal mood and affect. Her behavior is normal.         Assessment:  6 months  s/p LSG with GDW (previous AGB) on 9/8/17     ICD-10-CM ICD-9-CM   1. Status post bariatric surgery Z98.84 V45.86   2. Obesity, Class I, BMI 30-34.9 E66.9 278.00         Plan:  Discussed ways to make improvements. Strive for 3 meals a day, high protein, low carb, with healthy snacks limiting sugar. Increase protein to >70, target 100g daily. Carbs <100 (or less). Target calories 1400.  Add in routine exercise, has equipment at home, carve in 3 days a week to start, cardio + strength training.  Routine bariatric labs done 3/1/18, start weekly Vit D as rx.  Continue MVI otherwise.   Discussed GB workup, will hold off per patient request, she will let us know if symptoms continue or worsen.  Call w/ problems/concerns.     The patient was instructed to follow up in 3 months, sooner if needed.      Total time spent w/ patient 25 minutes and 15 minutes spent counseling the patient on nutrition and necessary dietary/lifestyle modifications.

## 2018-04-18 RX ORDER — ERGOCALCIFEROL 1.25 MG/1
CAPSULE ORAL
Qty: 4 CAPSULE | Refills: 0 | Status: SHIPPED | OUTPATIENT
Start: 2018-04-18 | End: 2018-06-19 | Stop reason: SDUPTHER

## 2018-06-13 ENCOUNTER — OFFICE VISIT (OUTPATIENT)
Dept: BARIATRICS/WEIGHT MGMT | Facility: CLINIC | Age: 38
End: 2018-06-13

## 2018-06-13 VITALS
HEIGHT: 67 IN | SYSTOLIC BLOOD PRESSURE: 124 MMHG | HEART RATE: 67 BPM | OXYGEN SATURATION: 99 % | RESPIRATION RATE: 18 BRPM | DIASTOLIC BLOOD PRESSURE: 84 MMHG | WEIGHT: 220.01 LBS | TEMPERATURE: 97.6 F | BODY MASS INDEX: 34.53 KG/M2

## 2018-06-13 DIAGNOSIS — E55.9 HYPOVITAMINOSIS D: ICD-10-CM

## 2018-06-13 DIAGNOSIS — Z13.0 SCREENING, IRON DEFICIENCY ANEMIA: ICD-10-CM

## 2018-06-13 DIAGNOSIS — Z90.3 POSTGASTRECTOMY MALABSORPTION: ICD-10-CM

## 2018-06-13 DIAGNOSIS — K91.2 POSTGASTRECTOMY MALABSORPTION: ICD-10-CM

## 2018-06-13 DIAGNOSIS — E66.9 OBESITY, CLASS I, BMI 30-34.9: ICD-10-CM

## 2018-06-13 DIAGNOSIS — R53.83 FATIGUE, UNSPECIFIED TYPE: ICD-10-CM

## 2018-06-13 DIAGNOSIS — Z98.84 STATUS POST BARIATRIC SURGERY: Primary | ICD-10-CM

## 2018-06-13 DIAGNOSIS — Z13.21 MALNUTRITION SCREEN: ICD-10-CM

## 2018-06-13 PROCEDURE — 99214 OFFICE O/P EST MOD 30 MIN: CPT | Performed by: PHYSICIAN ASSISTANT

## 2018-06-13 NOTE — PROGRESS NOTES
"[facility] Bariatric Surgery  [address]  Former[facility] [id number] [phone]        Patient Name:  Lindsey Sprague.  :  1980      Date of Visit: 2018      Reason for Visit:   9 months postop      HPI: Lindsey Sprague is a 38 y.o. female  s/p LSG with GDW (previous AGB) on 17     Doing well.  No issues/concerns. Is pleased with weight loss, has lost 42\" with signficant difference in before/ after pics. Feeling better, has come off dual antihypertensives and depression meds.  Would like to lose another 20lb. States last 10lb has come off really slow. Knows she can make improvements in diet.  Has noticed rash of abdominal skin fold/ umbilicus at times. Denies dysphagia, reflux, nausea, vomiting and abdominal pain.  Getting 60-70g prot/day.  Drinking 60 fluid oz/day, water, coffee.  6 month labs revealed low VIt D, advised weekly Vit D .  Taking Patches: MVI, Vit D once a week. Does not require antacids.  Exercising more recently, treadmill.        Presurgery weight: 279 pounds.  Today's weight is 99.8 kg (220 lb 0.2 oz) pounds, today's  Body mass index is 34.46 kg/m²., and her weight loss since surgery is 59 pounds.      Past Medical History:   Diagnosis Date   • Anxiety    • Depression    • Dyspepsia     Denies GB sx's.  serum h. pyl neg   • Dyspnea on exertion    • Elevated HDL     81   • Elevated LDL cholesterol level     102   • Fatigue    • Heart murmur     heard on exam today, no hx, asx   • Heartburn     infrequent, no meds, poss recurrent HH on EGD 2017 GDW   • Hypertension    • Menstrual irregularity    • Morbid obesity    • Polycystic ovarian syndrome    • Wears eyeglasses      Past Surgical History:   Procedure Laterality Date   • ENDOSCOPY N/A 2017    Procedure: ESOPHAGOGASTRODUODENOSCOPY;  Surgeon: Karthik Campuzano MD;  Location: [facility];  Service:    • GASTRIC BANDING REMOVAL  2017    s/p AGBR 17 by GDW for chronic " "pain/nausea/pouch enlargement   • GASTRIC SLEEVE LAPAROSCOPIC N/A 9/8/2017    Procedure: GASTRIC SLEEVE LAPAROSCOPIC;  Surgeon: Karthik Campuzano MD;  Location: [facility];  Service:    • LAPAROSCOPIC GASTRIC BANDING  2011    s/p LAGB APS w/ HHR 3/2011 by GDW   • TONSILLECTOMY  1994     Outpatient Prescriptions Marked as Taking for the 6/13/18 encounter (Office Visit) with Beatriz Flores PA-C   Medication Sig Dispense Refill   • melatonin 5 MG tablet tablet Take 5 mg by mouth Every Night.     • traZODone (DESYREL) 100 MG tablet Take 100 mg by mouth Every Night.     • vitamin D (ERGOCALCIFEROL) 13587 units capsule capsule TAKE ONE CAPSULE BY MOUTH ONCE WEEKLY 4 capsule 0       No Known Allergies    Social History     Social History   • Marital status:      Spouse name: N/A   • Number of children: N/A   • Years of education: N/A     Occupational History   • Not on file.     Social History Main Topics   • Smoking status: Never Smoker   • Smokeless tobacco: Never Used   • Alcohol use No   • Drug use: No   • Sexual activity: Not on file     Other Topics Concern   • Not on file     Social History Narrative    .  Lives in [facility] w/  and 3 children.   at [facility].         /84 (BP Location: Left arm, Patient Position: Sitting, Cuff Size: Large Adult)   Pulse 67   Temp 97.6 °F (36.4 °C) (Temporal Artery )   Resp 18   Ht 170.2 cm (67\")   Wt 99.8 kg (220 lb 0.2 oz)   SpO2 99%   BMI 34.46 kg/m²     Physical Exam   Constitutional: She appears well-developed and well-nourished.   HENT:   Head: Normocephalic and atraumatic.   Cardiovascular: Normal rate, regular rhythm and normal heart sounds.    Pulmonary/Chest: Effort normal and breath sounds normal. No respiratory distress. She has no wheezes.   Abdominal: Soft. Bowel sounds are normal. She exhibits no distension. There is no tenderness.   Lap scars well healed   Neurological: She is alert.   Skin: Skin " is warm and dry. There is erythema (at umbilicus).   Psychiatric: She has a normal mood and affect. Her behavior is normal. Judgment and thought content normal.         Assessment:  9 months s/p LSG with GDW (previous AGB) on 9/8/17     ICD-10-CM ICD-9-CM   1. Status post bariatric surgery Z98.84 V45.86   2. Fatigue, unspecified type R53.83 780.79   3. Obesity, Class I, BMI 30-34.9 E66.9 278.00   4. Hypovitaminosis D E55.9 268.9   5. Screening, iron deficiency anemia Z13.0 V78.0   6. Malnutrition screen Z13.21 V77.2   7. Postgastrectomy malabsorption K91.2 579.3    Z90.3          Plan:  Doing well. Advised antifungal powder OTC for umblicus PRN. Continue w/ good food choices and healthy habits.  Continue to focus on high protein, low carb. Goal of protein 100g daily, calories 1400.  Start tracking intake.  Continue routine exercise.  Routine bariatric labs ordered.  Continue vitamins w/ adjustments pending lab results.  Call w/ problems/concerns.     The patient was instructed to follow up in 3 months, sooner if needed.    Total time spent w/ patient 25 minutes and 15 minutes spent counseling the patient on nutrition and necessary dietary/lifestyle modifications.

## 2018-06-16 LAB
25(OH)D3+25(OH)D2 SERPL-MCNC: 25.6 NG/ML
ALBUMIN SERPL-MCNC: 4.28 G/DL (ref 3.2–4.8)
ALBUMIN/GLOB SERPL: 1.5 G/DL (ref 1.5–2.5)
ALP SERPL-CCNC: 81 U/L (ref 25–100)
ALT SERPL-CCNC: 13 U/L (ref 7–40)
AST SERPL-CCNC: 16 U/L (ref 0–33)
BASOPHILS # BLD AUTO: 0.03 10*3/MM3 (ref 0–0.2)
BASOPHILS NFR BLD AUTO: 0.4 % (ref 0–1)
BILIRUB SERPL-MCNC: 0.4 MG/DL (ref 0.3–1.2)
BUN SERPL-MCNC: 13 MG/DL (ref 9–23)
BUN/CREAT SERPL: 14.9 (ref 7–25)
CALCIUM SERPL-MCNC: 9.2 MG/DL (ref 8.7–10.4)
CHLORIDE SERPL-SCNC: 105 MMOL/L (ref 99–109)
CO2 SERPL-SCNC: 29 MMOL/L (ref 20–31)
CREAT SERPL-MCNC: 0.87 MG/DL (ref 0.6–1.3)
EOSINOPHIL # BLD AUTO: 0.15 10*3/MM3 (ref 0–0.3)
EOSINOPHIL NFR BLD AUTO: 2.2 % (ref 0–3)
ERYTHROCYTE [DISTWIDTH] IN BLOOD BY AUTOMATED COUNT: 13.1 % (ref 11.3–14.5)
FERRITIN SERPL-MCNC: 7 NG/ML (ref 10–291)
FOLATE SERPL-MCNC: 5.11 NG/ML (ref 3.2–20)
GFR SERPLBLD CREATININE-BSD FMLA CKD-EPI: 72 ML/MIN/1.73
GFR SERPLBLD CREATININE-BSD FMLA CKD-EPI: 88 ML/MIN/1.73
GLOBULIN SER CALC-MCNC: 2.8 GM/DL
GLUCOSE SERPL-MCNC: 95 MG/DL (ref 70–100)
HCT VFR BLD AUTO: 41.5 % (ref 34.5–44)
HGB BLD-MCNC: 13.6 G/DL (ref 11.5–15.5)
IMM GRANULOCYTES # BLD: 0.01 10*3/MM3 (ref 0–0.03)
IMM GRANULOCYTES NFR BLD: 0.1 % (ref 0–0.6)
IRON SERPL-MCNC: 41 MCG/DL (ref 50–175)
LYMPHOCYTES # BLD AUTO: 2.66 10*3/MM3 (ref 0.6–4.8)
LYMPHOCYTES NFR BLD AUTO: 38.3 % (ref 24–44)
Lab: NORMAL
MCH RBC QN AUTO: 26.5 PG (ref 27–31)
MCHC RBC AUTO-ENTMCNC: 32.8 G/DL (ref 32–36)
MCV RBC AUTO: 80.7 FL (ref 80–99)
METHYLMALONATE SERPL-SCNC: 240 NMOL/L (ref 0–378)
MONOCYTES # BLD AUTO: 0.56 10*3/MM3 (ref 0–1)
MONOCYTES NFR BLD AUTO: 8.1 % (ref 0–12)
NEUTROPHILS # BLD AUTO: 3.53 10*3/MM3 (ref 1.5–8.3)
NEUTROPHILS NFR BLD AUTO: 50.9 % (ref 41–71)
PLATELET # BLD AUTO: 299 10*3/MM3 (ref 150–450)
POTASSIUM SERPL-SCNC: 5.2 MMOL/L (ref 3.5–5.5)
PREALB SERPL-MCNC: 21 MG/DL (ref 14–35)
PROT SERPL-MCNC: 7.1 G/DL (ref 5.7–8.2)
RBC # BLD AUTO: 5.14 10*6/MM3 (ref 3.89–5.14)
SODIUM SERPL-SCNC: 139 MMOL/L (ref 132–146)
VIT B1 BLD-SCNC: 94.6 NMOL/L (ref 66.5–200)
WBC # BLD AUTO: 6.94 10*3/MM3 (ref 3.5–10.8)

## 2018-06-19 RX ORDER — ERGOCALCIFEROL 1.25 MG/1
50000 CAPSULE ORAL WEEKLY
Qty: 4 CAPSULE | Refills: 0 | Status: SHIPPED | OUTPATIENT
Start: 2018-06-19 | End: 2018-09-18

## 2018-09-18 ENCOUNTER — OFFICE VISIT (OUTPATIENT)
Dept: BARIATRICS/WEIGHT MGMT | Facility: CLINIC | Age: 38
End: 2018-09-18

## 2018-09-18 VITALS
RESPIRATION RATE: 18 BRPM | HEART RATE: 63 BPM | TEMPERATURE: 97.8 F | WEIGHT: 215.51 LBS | OXYGEN SATURATION: 99 % | SYSTOLIC BLOOD PRESSURE: 128 MMHG | DIASTOLIC BLOOD PRESSURE: 70 MMHG | HEIGHT: 67 IN | BODY MASS INDEX: 33.82 KG/M2

## 2018-09-18 DIAGNOSIS — R10.13 DYSPEPSIA: Primary | ICD-10-CM

## 2018-09-18 DIAGNOSIS — E61.1 IRON DEFICIENCY: ICD-10-CM

## 2018-09-18 DIAGNOSIS — I10 ESSENTIAL HYPERTENSION: ICD-10-CM

## 2018-09-18 DIAGNOSIS — R53.83 FATIGUE, UNSPECIFIED TYPE: ICD-10-CM

## 2018-09-18 DIAGNOSIS — E66.9 OBESITY, CLASS I, BMI 30-34.9: ICD-10-CM

## 2018-09-18 DIAGNOSIS — Z98.84 S/P BARIATRIC SURGERY: ICD-10-CM

## 2018-09-18 DIAGNOSIS — E55.9 VITAMIN D DEFICIENCY: ICD-10-CM

## 2018-09-18 PROCEDURE — 99214 OFFICE O/P EST MOD 30 MIN: CPT | Performed by: PHYSICIAN ASSISTANT

## 2018-09-18 RX ORDER — HYDROXYZINE PAMOATE 25 MG/1
25 CAPSULE ORAL AS NEEDED
COMMUNITY
Start: 2018-09-14

## 2018-09-18 NOTE — PROGRESS NOTES
[facility] Bariatric Surgery  [address]  [facility] [id number] [phone]      Patient Name:  Lindsey Sprague.  :  1980      Date of Visit: 2018      Reason for Visit:  Annual Eval - 1 year postop    HPI:  Lindsey Sprague is a 38 y.o. female s/p LSG with GDW (previous AGB) on 17    LOV - iron infusion advised but says she was unaware.  In the past 3 months has had 2 episodes of sharp stabbing abd.pain.  The 1st was RUQ pain, but the 2nd most recent episode was left-sided abd.pain.  Denies associated N/V/F/C.  Admits she did not take her Actigall faithfully.  No longer on a PPI.  Denies ASA/NSAID/steroid/tobacco use.    Wishes she could lose more, but says also giving herself abbie.  Stays busy w/ life, family, work.  Wants to start getting up earlier in the AM to exercise.  Enjoys yoga/pilates - has a pilates machine at home.      Labs 2018 - low Vit D (25.6), low iron (41), low ferritin (7), Hgb 13.6.  Uses a daily MVI patch.     Presurgery weight: 274 pounds.  Today's weight is 97.8 kg (215 lb 8.2 oz) pounds, today's  Body mass index is 33.75 kg/m²., and her weight loss since surgery is 59 pounds.       Past Medical History:   Diagnosis Date   • Anxiety    • Depression    • Dyspepsia     Denies GB sx's.  serum h. pyl neg   • Dyspnea on exertion    • Elevated HDL     81   • Elevated LDL cholesterol level     102   • Fatigue    • Heart murmur     heard on exam today, no hx, asx   • Heartburn     infrequent, no meds, poss recurrent HH on EGD 2017 GDW   • Hypertension    • Menstrual irregularity    • Morbid obesity (CMS/HCC)    • Polycystic ovarian syndrome    • Wears eyeglasses      Past Surgical History:   Procedure Laterality Date   • ENDOSCOPY N/A 2017    Procedure: ESOPHAGOGASTRODUODENOSCOPY;  Surgeon: Karthik Campuzano MD;  Location: [facility];  Service:    • GASTRIC BANDING REMOVAL  2017    s/p AGBR 17 by VIJI for chronic  "pain/nausea/pouch enlargement   • GASTRIC SLEEVE LAPAROSCOPIC N/A 9/8/2017    Procedure: GASTRIC SLEEVE LAPAROSCOPIC;  Surgeon: Karthik Campuzano MD;  Location: [facility];  Service:    • LAPAROSCOPIC GASTRIC BANDING  2011    s/p LAGB APS w/ HHR 3/2011 by GDW   • TONSILLECTOMY  1994     Outpatient Prescriptions Marked as Taking for the 9/18/18 encounter (Office Visit) with Corina Hanson PA   Medication Sig Dispense Refill   • hydrOXYzine (VISTARIL) 25 MG capsule 25 mg As Needed.     • melatonin 5 MG tablet tablet Take 5 mg by mouth Every Night.     • traZODone (DESYREL) 100 MG tablet Take 100 mg by mouth Every Night.       No Known Allergies    Social History     Social History   • Marital status:      Spouse name: N/A   • Number of children: N/A   • Years of education: N/A     Occupational History   • Not on file.     Social History Main Topics   • Smoking status: Never Smoker   • Smokeless tobacco: Never Used   • Alcohol use No   • Drug use: No   • Sexual activity: Not on file     Other Topics Concern   • Not on file     Social History Narrative    .  Lives in [facility] w/  and 3 children.   at [facility].         /70 (BP Location: Left arm, Patient Position: Sitting, Cuff Size: Large Adult)   Pulse 63   Temp 97.8 °F (36.6 °C) (Temporal Artery )   Resp 18   Ht 170.2 cm (67\")   Wt 97.8 kg (215 lb 8.2 oz)   SpO2 99%   BMI 33.75 kg/m²   Physical Exam   Constitutional: She appears well-developed and well-nourished. She is cooperative.   HENT:   Mouth/Throat: Oropharynx is clear and moist and mucous membranes are normal.   Eyes: Conjunctivae are normal. No scleral icterus.   Cardiovascular: Normal rate.    Pulmonary/Chest: Effort normal.   Abdominal: Soft. There is no tenderness. No hernia.   negative Barakat's sign   Musculoskeletal: Normal range of motion. She exhibits no edema.   Neurological: She is alert.   Skin: Skin is warm and dry. No " rash noted.   Psychiatric: She has a normal mood and affect. Judgment normal.         Assessment:   1 year s/p LSG with GDW (previous AGB) on 9/8/17    ICD-10-CM ICD-9-CM   1. Dyspepsia R10.13 536.8   2. Fatigue, unspecified type R53.83 780.79   3. Iron deficiency E61.1 280.9   4. Vitamin D deficiency E55.9 268.9   5. Essential hypertension I10 401.9   6. Obesity, Class I, BMI 30-34.9 E66.9 278.00   7. S/P bariatric surgery Z98.84 V45.86       Plan:  Routine bariatric labs ordered.  Vitamin adjustments/recommendations pending results.  Will order UGI + GBUS to further evaluate abd.pain.  Further input pending results.  Otherwise continue w/ healthy habits.  Encouraged to start exercising in the AM - she set a goal of twice weekly for now.  Call w/ issues/concerns.        The patient was instructed to follow up in 6 months, sooner if needed.     JEAN Miles

## 2018-09-21 LAB
25(OH)D3+25(OH)D2 SERPL-MCNC: 32.8 NG/ML
ALBUMIN SERPL-MCNC: 4.51 G/DL (ref 3.2–4.8)
ALBUMIN/GLOB SERPL: 1.8 G/DL (ref 1.5–2.5)
ALP SERPL-CCNC: 73 U/L (ref 25–100)
ALT SERPL-CCNC: 14 U/L (ref 7–40)
AST SERPL-CCNC: 15 U/L (ref 0–33)
BASOPHILS # BLD AUTO: 0.02 10*3/MM3 (ref 0–0.2)
BASOPHILS NFR BLD AUTO: 0.1 % (ref 0–1)
BILIRUB SERPL-MCNC: 0.5 MG/DL (ref 0.3–1.2)
BUN SERPL-MCNC: 12 MG/DL (ref 9–23)
BUN/CREAT SERPL: 13.8 (ref 7–25)
CALCIUM SERPL-MCNC: 9.6 MG/DL (ref 8.7–10.4)
CHLORIDE SERPL-SCNC: 101 MMOL/L (ref 99–109)
CHOLEST SERPL-MCNC: 159 MG/DL (ref 0–200)
CO2 SERPL-SCNC: 29 MMOL/L (ref 20–31)
CREAT SERPL-MCNC: 0.87 MG/DL (ref 0.6–1.3)
EOSINOPHIL # BLD AUTO: 0.03 10*3/MM3 (ref 0–0.3)
EOSINOPHIL NFR BLD AUTO: 0.2 % (ref 0–3)
ERYTHROCYTE [DISTWIDTH] IN BLOOD BY AUTOMATED COUNT: 13.4 % (ref 11.3–14.5)
FERRITIN SERPL-MCNC: 6 NG/ML (ref 10–291)
FOLATE SERPL-MCNC: 5.34 NG/ML (ref 3.2–20)
GLOBULIN SER CALC-MCNC: 2.5 GM/DL
GLUCOSE SERPL-MCNC: 88 MG/DL (ref 70–100)
HCT VFR BLD AUTO: 42.4 % (ref 34.5–44)
HDLC SERPL-MCNC: 79 MG/DL (ref 40–60)
HGB BLD-MCNC: 13.8 G/DL (ref 11.5–15.5)
IMM GRANULOCYTES # BLD: 0.03 10*3/MM3 (ref 0–0.03)
IMM GRANULOCYTES NFR BLD: 0.2 % (ref 0–0.6)
IRON SERPL-MCNC: 43 MCG/DL (ref 50–175)
LDLC SERPL CALC-MCNC: 67 MG/DL (ref 0–100)
LYMPHOCYTES # BLD AUTO: 2.46 10*3/MM3 (ref 0.6–4.8)
LYMPHOCYTES NFR BLD AUTO: 16.3 % (ref 24–44)
Lab: NORMAL
MCH RBC QN AUTO: 26.1 PG (ref 27–31)
MCHC RBC AUTO-ENTMCNC: 32.5 G/DL (ref 32–36)
MCV RBC AUTO: 80.2 FL (ref 80–99)
METHYLMALONATE SERPL-SCNC: 232 NMOL/L (ref 0–378)
MONOCYTES # BLD AUTO: 0.53 10*3/MM3 (ref 0–1)
MONOCYTES NFR BLD AUTO: 3.5 % (ref 0–12)
NEUTROPHILS # BLD AUTO: 12 10*3/MM3 (ref 1.5–8.3)
NEUTROPHILS NFR BLD AUTO: 79.7 % (ref 41–71)
PLATELET # BLD AUTO: 328 10*3/MM3 (ref 150–450)
POTASSIUM SERPL-SCNC: 4 MMOL/L (ref 3.5–5.5)
PREALB SERPL-MCNC: 30 MG/DL (ref 14–35)
PROT SERPL-MCNC: 7 G/DL (ref 5.7–8.2)
RBC # BLD AUTO: 5.29 10*6/MM3 (ref 3.89–5.14)
SODIUM SERPL-SCNC: 138 MMOL/L (ref 132–146)
TRIGL SERPL-MCNC: 65 MG/DL (ref 0–150)
VIT B1 BLD-SCNC: 131.2 NMOL/L (ref 66.5–200)
VLDLC SERPL CALC-MCNC: 13 MG/DL
WBC # BLD AUTO: 15.07 10*3/MM3 (ref 3.5–10.8)

## 2018-09-25 PROBLEM — K90.89 POOR IRON ABSORPTION: Status: ACTIVE | Noted: 2018-09-25

## 2018-09-25 PROBLEM — E61.1 IRON DEFICIENCY: Status: ACTIVE | Noted: 2018-09-25

## 2018-09-25 RX ORDER — FAMOTIDINE 10 MG/ML
20 INJECTION, SOLUTION INTRAVENOUS ONCE
Status: CANCELLED | OUTPATIENT
Start: 2018-09-25

## 2018-09-25 RX ORDER — SODIUM CHLORIDE 9 MG/ML
250 INJECTION, SOLUTION INTRAVENOUS ONCE
Status: CANCELLED | OUTPATIENT
Start: 2018-09-25

## 2018-09-25 RX ORDER — DIPHENHYDRAMINE HCL 25 MG
25 CAPSULE ORAL ONCE
Status: CANCELLED | OUTPATIENT
Start: 2018-09-25

## 2018-09-26 ENCOUNTER — TELEPHONE (OUTPATIENT)
Dept: BARIATRICS/WEIGHT MGMT | Facility: CLINIC | Age: 38
End: 2018-09-26

## 2018-09-26 NOTE — TELEPHONE ENCOUNTER
Pt called in stating that her Iron infusion was denied and someone (pt did not get a name) stated that they had spoken with you, nut needs more information and was wanting you to call them.  The number is [phone]. Pt stated that they are denying it due to her being Iron deficient and not anemic.  Please advise, thank you.

## 2018-09-27 ENCOUNTER — APPOINTMENT (OUTPATIENT)
Dept: ONCOLOGY | Facility: HOSPITAL | Age: 38
End: 2018-09-27

## 2018-09-27 ENCOUNTER — INFUSION (OUTPATIENT)
Dept: ONCOLOGY | Facility: HOSPITAL | Age: 38
End: 2018-09-27

## 2018-09-27 ENCOUNTER — APPOINTMENT (OUTPATIENT)
Dept: GENERAL RADIOLOGY | Facility: HOSPITAL | Age: 38
End: 2018-09-27

## 2018-09-27 ENCOUNTER — HOSPITAL ENCOUNTER (OUTPATIENT)
Dept: ULTRASOUND IMAGING | Facility: HOSPITAL | Age: 38
Discharge: HOME | End: 2018-09-27
Admitting: PHYSICIAN ASSISTANT

## 2018-09-27 VITALS
DIASTOLIC BLOOD PRESSURE: 76 MMHG | HEART RATE: 84 BPM | HEIGHT: 67 IN | RESPIRATION RATE: 16 BRPM | WEIGHT: 220 LBS | SYSTOLIC BLOOD PRESSURE: 125 MMHG | TEMPERATURE: 97.9 F | BODY MASS INDEX: 34.53 KG/M2

## 2018-09-27 DIAGNOSIS — K90.89 POOR IRON ABSORPTION: ICD-10-CM

## 2018-09-27 DIAGNOSIS — E61.1 IRON DEFICIENCY: Primary | ICD-10-CM

## 2018-09-27 DIAGNOSIS — R10.13 DYSPEPSIA: ICD-10-CM

## 2018-09-27 PROCEDURE — 96375 TX/PRO/DX INJ NEW DRUG ADDON: CPT

## 2018-09-27 PROCEDURE — 63710000001 DIPHENHYDRAMINE PER 50 MG: Performed by: PHYSICIAN ASSISTANT

## 2018-09-27 PROCEDURE — 25010000002 FERUMOXYTOL 510 MG/17ML SOLUTION 510 MG VIAL: Performed by: PHYSICIAN ASSISTANT

## 2018-09-27 PROCEDURE — 76705 ECHO EXAM OF ABDOMEN: CPT

## 2018-09-27 PROCEDURE — 96374 THER/PROPH/DIAG INJ IV PUSH: CPT

## 2018-09-27 RX ORDER — FAMOTIDINE 10 MG/ML
20 INJECTION, SOLUTION INTRAVENOUS ONCE
Status: CANCELLED | OUTPATIENT
Start: 2018-09-27

## 2018-09-27 RX ORDER — SODIUM CHLORIDE 9 MG/ML
250 INJECTION, SOLUTION INTRAVENOUS ONCE
Status: COMPLETED | OUTPATIENT
Start: 2018-09-27 | End: 2018-09-27

## 2018-09-27 RX ORDER — SODIUM CHLORIDE 9 MG/ML
250 INJECTION, SOLUTION INTRAVENOUS ONCE
Status: CANCELLED | OUTPATIENT
Start: 2018-09-27

## 2018-09-27 RX ORDER — DIPHENHYDRAMINE HCL 25 MG
25 CAPSULE ORAL ONCE
Status: CANCELLED | OUTPATIENT
Start: 2018-09-27

## 2018-09-27 RX ORDER — DIPHENHYDRAMINE HCL 25 MG
25 CAPSULE ORAL ONCE
Status: COMPLETED | OUTPATIENT
Start: 2018-09-27 | End: 2018-09-27

## 2018-09-27 RX ORDER — FAMOTIDINE 10 MG/ML
20 INJECTION, SOLUTION INTRAVENOUS ONCE
Status: COMPLETED | OUTPATIENT
Start: 2018-09-27 | End: 2018-09-27

## 2018-09-27 RX ADMIN — FAMOTIDINE 20 MG: 10 INJECTION, SOLUTION INTRAVENOUS at 12:32

## 2018-09-27 RX ADMIN — SODIUM CHLORIDE 250 ML: 9 INJECTION, SOLUTION INTRAVENOUS at 12:33

## 2018-09-27 RX ADMIN — DIPHENHYDRAMINE HYDROCHLORIDE 25 MG: 25 CAPSULE ORAL at 12:30

## 2018-09-27 RX ADMIN — FERUMOXYTOL 510 MG: 510 INJECTION INTRAVENOUS at 12:54

## 2018-09-27 NOTE — TELEPHONE ENCOUNTER
Notified pt that unfortunately she is not anemic, but her iron stores are very low and you feel an iron infusion would be very beneficial for her. But I let pt know that where she is not anemic, her insurance will not cover it. I let pt know that you advise that she call her insurance company to ask if there would be any type of iron infusion that they will cover, or if an appeal can be done. Let pt know that you want her to start using a daily iron patch from [email or web address] in the meantime. Pt verbalized understanding, and stated she will contact her insurance company and see what she can find out and let us know.

## 2018-10-03 ENCOUNTER — APPOINTMENT (OUTPATIENT)
Dept: ONCOLOGY | Facility: HOSPITAL | Age: 38
End: 2018-10-03

## 2018-10-03 ENCOUNTER — INFUSION (OUTPATIENT)
Dept: ONCOLOGY | Facility: HOSPITAL | Age: 38
End: 2018-10-03

## 2018-10-03 VITALS
DIASTOLIC BLOOD PRESSURE: 78 MMHG | TEMPERATURE: 97.7 F | RESPIRATION RATE: 16 BRPM | HEART RATE: 69 BPM | WEIGHT: 219 LBS | HEIGHT: 67 IN | SYSTOLIC BLOOD PRESSURE: 127 MMHG | BODY MASS INDEX: 34.37 KG/M2

## 2018-10-03 DIAGNOSIS — E61.1 IRON DEFICIENCY: Primary | ICD-10-CM

## 2018-10-03 DIAGNOSIS — K90.89 POOR IRON ABSORPTION: ICD-10-CM

## 2018-10-03 PROCEDURE — 25010000002 FERUMOXYTOL 510 MG/17ML SOLUTION 510 MG VIAL: Performed by: PHYSICIAN ASSISTANT

## 2018-10-03 PROCEDURE — 63710000001 DIPHENHYDRAMINE PER 50 MG: Performed by: PHYSICIAN ASSISTANT

## 2018-10-03 PROCEDURE — 96375 TX/PRO/DX INJ NEW DRUG ADDON: CPT

## 2018-10-03 PROCEDURE — 96374 THER/PROPH/DIAG INJ IV PUSH: CPT

## 2018-10-03 RX ORDER — DIPHENHYDRAMINE HCL 25 MG
25 CAPSULE ORAL ONCE
Status: COMPLETED | OUTPATIENT
Start: 2018-10-03 | End: 2018-10-03

## 2018-10-03 RX ORDER — FAMOTIDINE 10 MG/ML
20 INJECTION, SOLUTION INTRAVENOUS ONCE
Status: COMPLETED | OUTPATIENT
Start: 2018-10-03 | End: 2018-10-03

## 2018-10-03 RX ORDER — SODIUM CHLORIDE 9 MG/ML
250 INJECTION, SOLUTION INTRAVENOUS ONCE
Status: CANCELLED | OUTPATIENT
Start: 2018-10-04

## 2018-10-03 RX ORDER — FAMOTIDINE 10 MG/ML
20 INJECTION, SOLUTION INTRAVENOUS ONCE
Status: CANCELLED | OUTPATIENT
Start: 2018-10-04

## 2018-10-03 RX ORDER — DIPHENHYDRAMINE HCL 25 MG
25 CAPSULE ORAL ONCE
Status: CANCELLED | OUTPATIENT
Start: 2018-10-04

## 2018-10-03 RX ORDER — SODIUM CHLORIDE 9 MG/ML
250 INJECTION, SOLUTION INTRAVENOUS ONCE
Status: COMPLETED | OUTPATIENT
Start: 2018-10-03 | End: 2018-10-03

## 2018-10-03 RX ADMIN — FERUMOXYTOL 510 MG: 510 INJECTION INTRAVENOUS at 12:40

## 2018-10-03 RX ADMIN — FAMOTIDINE 20 MG: 10 INJECTION, SOLUTION INTRAVENOUS at 12:24

## 2018-10-03 RX ADMIN — DIPHENHYDRAMINE HYDROCHLORIDE 25 MG: 25 CAPSULE ORAL at 12:23

## 2018-10-03 RX ADMIN — SODIUM CHLORIDE 250 ML: 9 INJECTION, SOLUTION INTRAVENOUS at 12:40

## 2018-10-11 ENCOUNTER — TELEPHONE (OUTPATIENT)
Dept: BARIATRICS/WEIGHT MGMT | Facility: CLINIC | Age: 38
End: 2018-10-11

## 2018-10-11 NOTE — TELEPHONE ENCOUNTER
----- Message from Karthik Campuzano MD    Regarding: RE: please advise    Any SHELBIE sx's?  Yes, lap arsenio, EGD fine, ty    ----- Message -----  From: Corina Hanson PA  To: Karthik Campuzano MD                                 38 y.o. female 1 year s/p LSG with GDW (previous AGB) on 9/8/17    Saw her recently in the office for annual eval.  In the past 3 months has had 2 episodes of sharp stabbing abd.pain.  The 1st was RUQ pain, but the 2nd most recent episode was left-sided abd.pain.  Denies associated N/V/F/C.  No reported heartburn.  Admits she did not take her Actigall faithfully.  No longer on a PPI.  Denies ASA/NSAID/steroid/tobacco use.      Labs 9/2018 - low iron (infusion ordered), WBC 15 (not sure why, but she did have a cold), LFTs WNL.        GBUS  9/27/18 reveals gallstones, o/w unremarkable.    I ordered an UGI also, but she says she cannot afford to have it done.  Wants to know if you will do a lap arsenio w/out her having it done...      Lap arsenio + EGD okay?  Thanks!

## 2018-10-11 NOTE — TELEPHONE ENCOUNTER
Notified pt that you have reviewed her testing with Dr Campuzano. I let her know that you know that you are concerned about the cost of her UGI and have not done that yet because of the cost, but I stated to pt that is she is not having any issues w/ heartburn, indigestion, reflux, or diff swallowing that Dr Campuzano stated that we can cancel the UGI. Pt denies all of these symptoms.  I told pt that he does recommend her having a lapchole due to the findings of the gallstones on her recent GBUS.  I told pt that he also will do an EGD at he time of her lapchole.  Pt verbalized understanding.  I told her to she had to schedule a f/up in the office to discuss further, pt verbalized understanding, and scheduling with Laly lawson.

## 2018-10-12 ENCOUNTER — OFFICE VISIT (OUTPATIENT)
Dept: BARIATRICS/WEIGHT MGMT | Facility: CLINIC | Age: 38
End: 2018-10-12

## 2018-10-12 VITALS
DIASTOLIC BLOOD PRESSURE: 90 MMHG | OXYGEN SATURATION: 99 % | SYSTOLIC BLOOD PRESSURE: 132 MMHG | HEIGHT: 67 IN | BODY MASS INDEX: 34.21 KG/M2 | HEART RATE: 62 BPM | WEIGHT: 218 LBS | RESPIRATION RATE: 18 BRPM | TEMPERATURE: 97.7 F

## 2018-10-12 DIAGNOSIS — R10.13 EPIGASTRIC PAIN: ICD-10-CM

## 2018-10-12 DIAGNOSIS — R10.13 DYSPEPSIA: Primary | ICD-10-CM

## 2018-10-12 DIAGNOSIS — K80.20 CALCULUS OF GALLBLADDER WITHOUT CHOLECYSTITIS WITHOUT OBSTRUCTION: ICD-10-CM

## 2018-10-12 PROCEDURE — 99214 OFFICE O/P EST MOD 30 MIN: CPT | Performed by: PHYSICIAN ASSISTANT

## 2018-10-12 RX ORDER — OMEPRAZOLE 20 MG/1
20 CAPSULE, DELAYED RELEASE ORAL DAILY
Qty: 90 CAPSULE | Refills: 0 | Status: SHIPPED | OUTPATIENT
Start: 2018-10-12 | End: 2019-01-10

## 2018-10-12 NOTE — PROGRESS NOTES
"[facility] Bariatric Surgery  [address]  [facility] [address] [phone]      Patient Name:  Lindsey Sprague.  :  1980      Date of Visit: 10/12/2018      Reason for Visit:  abd.pain    HPI:  Lindsey Sprague is a 38 y.o. female s/p LSG with GDW (previous AGB) on 17    Saw her recently in the office for annual eval.  In the past 3 months reported 2 episodes of sharp stabbing abd.pain.  The 1st was RUQ pain, but the 2nd more recent episode was left-sided abd.pain.  Denied associated N/V/F/C.  No heartburn.  Admits she did not take her Actigall faithfully.  No longer on a PPI.  Denies ASA/NSAID/steroid/tobacco use.      Labs 2018 - low iron (infusion ordered), WBC 15, LFTs WNL.         GBUS  18 reveals gallstones, o/w unremarkable.  Says exam was very unremarkable.       UGI ordered, but patient says could not afford to have done.      Says now abd.pain is worsening.  Mostly epigastric. Persisting throughout the day.  Can be worse w/ PO intake but also noticed when driving, sitting, working, etc.  Still no issues w/ heartburn/reflux/N/V.  Has had some recent \"explosive diarrhea.\"  No fevers/chills.  No known ill contacts.       Presurgery weight: 274 pounds.  Today's weight is 98.9 kg (218 lb) pounds, today's  Body mass index is 34.14 kg/m²., and her weight loss since surgery is 56 pounds.       Past Medical History:   Diagnosis Date   • Anxiety    • Depression    • Dyspepsia     serum h. pyl neg 2017   • Dyspnea on exertion    • Elevated HDL     81   • Elevated LDL cholesterol level     102   • Fatigue    • Heart murmur     asx   • Heartburn     denies further issue s/p LSG - poss recurrent HH on EGD 2017 GDW   • Hypertension    • Iron deficiency     s/p IV iron 10/2018   • Menstrual irregularity    • Morbid obesity (CMS/HCC)    • Polycystic ovarian syndrome    • Wears eyeglasses      Past Surgical History:   Procedure Laterality Date   • " "ENDOSCOPY N/A 9/8/2017    Procedure: ESOPHAGOGASTRODUODENOSCOPY;  Surgeon: Karthik Campuzano MD;  [facility];  Service:    • GASTRIC BANDING REMOVAL  2017    s/p AGBR 2/6/17 by GDW for chronic pain/nausea/pouch enlargement   • GASTRIC SLEEVE LAPAROSCOPIC N/A 9/8/2017    Procedure: GASTRIC SLEEVE LAPAROSCOPIC;  Surgeon: Karthik Campuzano MD;  Location:  [facility];  Service:    • LAPAROSCOPIC GASTRIC BANDING  2011    s/p LAGB APS w/ HHR 3/2011 by GDW   • TONSILLECTOMY  1994     Outpatient Prescriptions Marked as Taking for the 10/12/18 encounter (Office Visit) with Corina Hanson PA   Medication Sig Dispense Refill   • hydrOXYzine (VISTARIL) 25 MG capsule 25 mg As Needed.     • melatonin 5 MG tablet tablet Take 5 mg by mouth Every Night.     • traZODone (DESYREL) 100 MG tablet Take 100 mg by mouth Every Night.       No Known Allergies    Social History     Social History   • Marital status:      Spouse name: N/A   • Number of children: N/A   • Years of education: N/A     Occupational History   • Not on file.     Social History Main Topics   • Smoking status: Never Smoker   • Smokeless tobacco: Never Used   • Alcohol use No   • Drug use: No   • Sexual activity: Not on file     Other Topics Concern   • Not on file     Social History Narrative    .  Lives in [facility] w/  and 3 children.   at [facility].         /90 (BP Location: Left arm, Patient Position: Sitting, Cuff Size: Large Adult)   Pulse 62   Temp 97.7 °F (36.5 °C) (Temporal Artery )   Resp 18   Ht 170.2 cm (67\")   Wt 98.9 kg (218 lb)   SpO2 99%   BMI 34.14 kg/m²   Physical Exam   Constitutional: She appears well-developed and well-nourished. She is cooperative.   HENT:   Mouth/Throat: Oropharynx is clear and moist and mucous membranes are normal.   Eyes: Conjunctivae are normal. No scleral icterus.   Cardiovascular: Normal rate.    Pulmonary/Chest: Effort normal.   Abdominal: " Soft. There is no tenderness. No hernia.   negative Barakat's sign   Musculoskeletal: Normal range of motion. She exhibits no edema.   Neurological: She is alert.   Skin: Skin is warm and dry. No rash noted.   Psychiatric: She has a normal mood and affect. Judgment normal.         Assessment:   1 year s/p LSG with GDW (previous AGB) on 9/8/17    ICD-10-CM ICD-9-CM   1. Dyspepsia R10.13 536.8   2. Calculus of gallbladder without cholecystitis without obstruction K80.20 574.20   3. Epigastric pain R10.13 789.06       Plan:   Lap Mary + EGD w/ Dr. Campuzano @ [facility].     Potential risk of bleeding, infection, bile leak, bile duct injury, bowel injury, pulm complications, venothromboembolic events, anesthesia reaction and death reviewed w/ patient.  All questions answered.  Understands there is no guarantee surgery will alleviate symptoms.  Patient agrees to proceed.  UGI was advised prior to surgery, but patient says she still does not wish to have done.  Will RX Omeprazole 20mg daily.  Further input pending.  Call w/ issues/concerns.        JEAN Miles

## 2018-10-22 ENCOUNTER — LAB REQUISITION (OUTPATIENT)
Dept: LAB | Facility: HOSPITAL | Age: 38
End: 2018-10-22

## 2018-10-22 DIAGNOSIS — K80.20 CALCULUS OF GALLBLADDER WITHOUT CHOLECYSTITIS WITHOUT OBSTRUCTION: ICD-10-CM

## 2018-10-22 PROCEDURE — 88305 TISSUE EXAM BY PATHOLOGIST: CPT | Performed by: SURGERY

## 2018-10-22 PROCEDURE — 43239 EGD BIOPSY SINGLE/MULTIPLE: CPT | Performed by: SURGERY

## 2018-10-22 PROCEDURE — 88304 TISSUE EXAM BY PATHOLOGIST: CPT | Performed by: SURGERY

## 2018-10-22 PROCEDURE — 47562 LAPAROSCOPIC CHOLECYSTECTOMY: CPT | Performed by: SURGERY

## 2018-10-23 LAB
LAB AP CASE REPORT: NORMAL
LAB AP CLINICAL INFORMATION: NORMAL
PATH REPORT.FINAL DX SPEC: NORMAL
PATH REPORT.GROSS SPEC: NORMAL

## 2018-10-24 DIAGNOSIS — R10.13 EPIGASTRIC PAIN: ICD-10-CM

## 2018-10-24 DIAGNOSIS — K80.20 CALCULUS OF GALLBLADDER WITHOUT CHOLECYSTITIS WITHOUT OBSTRUCTION: ICD-10-CM

## 2018-10-24 DIAGNOSIS — R10.13 DYSPEPSIA: ICD-10-CM

## 2018-11-07 ENCOUNTER — OFFICE VISIT (OUTPATIENT)
Dept: BARIATRICS/WEIGHT MGMT | Facility: CLINIC | Age: 38
End: 2018-11-07

## 2018-11-07 VITALS
HEIGHT: 67 IN | WEIGHT: 219 LBS | SYSTOLIC BLOOD PRESSURE: 128 MMHG | OXYGEN SATURATION: 99 % | RESPIRATION RATE: 18 BRPM | HEART RATE: 90 BPM | BODY MASS INDEX: 34.37 KG/M2 | TEMPERATURE: 97.8 F | DIASTOLIC BLOOD PRESSURE: 80 MMHG

## 2018-11-07 DIAGNOSIS — Z90.49 S/P CHOLECYSTECTOMY: Primary | ICD-10-CM

## 2018-11-07 PROCEDURE — 99024 POSTOP FOLLOW-UP VISIT: CPT | Performed by: PHYSICIAN ASSISTANT

## 2018-11-07 NOTE — PROGRESS NOTES
[facility] Bariatric Surgery  [address]  [facility] [address]  [phone]        Patient Name: Lindsey Sprague.  YOB: 1980      Date of Visit: 11/7/2018      Reason for Visit: POD#16 s/p lap arsenio/ EGD     HPI:  Lindsey Sprague is a 38 y.o. female s/p lap arsenio/ EGD with Dr. Campuzano 10/22/18 (s/p LSG with GDW (previous AGB) on 9/8/17)    Lap arsenio/ EGD findings: gallbladder packed with large, marble sized stones. Appeared chronically inflamed. Mildly enlarged, smooth liver. Sleeve resting nicely without adhesions. Mild pyloric spasms. No recurrent hiatal hernia.     Final Diagnosis   1. GALLBLADDER, CHOLECYSTECTOMY:  Chronic cholecystitis and cholelithiasis.  No metaplasia or dysplasia identified.   Two benign cystic duct lymph nodes.  2. GASTRIC ANTRUM BIOPSY:  Minimal chronic gastritis without activity with reactive features.   Negative for intestinal metaplasia and dysplasia.  No Helicobacter pylori-like organisms identified on routine stains.   3. DISTAL ESOPHAGUS BIOPSY:  Squamous mucosa with basal layer hyperplasia, vascular ectasia, and minimal increase in intraepithelial leukocytes without eosinophils.   Negative for intestinal metaplasia, dysplasia, and glandular mucosa.  Histologic features felt to be suggestive but not diagnostic of reflux esophagitis.      Patient states she has had a hard time recovering.  Has 2 incision sites sore with activity, no redness or drainage. Feels this is related to multiple surgeries using same sites. Has been fatigued, but overall feeling better and getting more energy. Denies nausea, vomiting, abdominal pain, dysphagia, BM issues. States she has not experienced the abdominal pain or symptoms noted pre-arsenio since surgery. Hoping that she will notice a big change in how she feels moving forward.  She is dealing with a  Rash that predates most recent surgery, spots located around trunk anterior/ posterior from  breasts to superior gluteal fold. Has seen dermatology, diagnosed contact dermatitis, advised changing detergents.  No other issues, concerns.      Presurgery weight: 274 pounds. Today's weight is 99.3 kg (219 lb) pounds, today's  Body mass index is 34.3 kg/m²., and her weight loss since surgery is 55 pounds.         Past Medical History:   Diagnosis Date   • Anxiety    • Depression    • Dyspepsia     serum h. pyl neg 5/2017   • Dyspnea on exertion    • Elevated HDL     81   • Elevated LDL cholesterol level     102   • Fatigue    • Heart murmur     asx   • Heartburn     denies further issue s/p LSG - poss recurrent HH on EGD 1/2017 GDW   • Hypertension    • Iron deficiency     s/p IV iron 10/2018   • Menstrual irregularity    • Morbid obesity (CMS/HCC)    • Polycystic ovarian syndrome    • Wears eyeglasses        Past Surgical History:   Procedure Laterality Date   • ENDOSCOPY N/A 9/8/2017    Procedure: ESOPHAGOGASTRODUODENOSCOPY;  Surgeon: Karthik Campuzano MD;  Location:  [facility];  Service:    • GASTRIC BANDING REMOVAL  2017    s/p AGBR 2/6/17 by [facility] for chronic pain/nausea/pouch enlargement   • GASTRIC SLEEVE LAPAROSCOPIC N/A 9/8/2017    Procedure: GASTRIC SLEEVE LAPAROSCOPIC;  Surgeon: Karthik Campuzano MD;  Location:  [facility];  Service:    • LAPAROSCOPIC GASTRIC BANDING  2011    s/p LAGB APS w/ HHR 3/2011 by [facility]   • TONSILLECTOMY  1994     Outpatient Prescriptions Marked as Taking for the 11/7/18 encounter (Office Visit) with Beatriz Flores PA-C   Medication Sig Dispense Refill   • hydrOXYzine (VISTARIL) 25 MG capsule 25 mg As Needed.     • melatonin 5 MG tablet tablet Take 5 mg by mouth Every Night.     • omeprazole (priLOSEC) 20 MG capsule Take 1 capsule by mouth Daily for 90 days. 90 capsule 0   • traZODone (DESYREL) 100 MG tablet Take 100 mg by mouth Every Night.       No Known Allergies    Social History     Social History   • Marital status:      Spouse name: N/A   • Number of children:  N/A   • Years of education: N/A     Occupational History   • Not on file.     Social History Main Topics   • Smoking status: Never Smoker   • Smokeless tobacco: Never Used   • Alcohol use No   • Drug use: No   • Sexual activity: Not on file     Other Topics Concern   • Not on file     Social History Narrative    .  Lives in [facility] w/  and 3 children.   at [facility].         Vitals:    11/07/18 1156   BP: 128/80   Pulse: 90   Resp: 18   Temp: 97.8 °F (36.6 °C)   SpO2: 99%     Weight 99.3 kg (219 lb)  Body mass index is 34.3 kg/m².    Physical Exam   Constitutional: She is oriented to person, place, and time. She appears well-developed and well-nourished.   HENT:   Head: Normocephalic and atraumatic.   Cardiovascular: Normal rate and regular rhythm.    Pulmonary/Chest: Effort normal and breath sounds normal.   Abdominal: Soft. Bowel sounds are normal.   Incisions healing well  Point tenderness at right 2 incision sites without erythema, drainage or fluctuance.    Neurological: She is alert and oriented to person, place, and time.   Skin: Skin is warm and dry. Rash (patches over trunk anterior/ posterior from breast to gluteal cleft, raised welt-like) noted.   Psychiatric: She has a normal mood and affect. Her behavior is normal. Judgment and thought content normal.         Assessment:  POD#16 s/p lap arsenio/ EGD     ICD-10-CM ICD-9-CM   1. S/P cholecystectomy Z90.49 V45.79       Plan:  Offered trigger point injection, declined. Let us know if abd pain arises, tenderness worsens or other symptoms arise. Cont care with dermatology for rash. Continue w/ good food choices and healthy habits.  Encouraged to focus on high protein, low carb.  Encouraged routine exercise. Call w/ issues/concerns.  RTC for routin LSG follow up, sooner if needed.

## 2019-04-03 ENCOUNTER — OFFICE VISIT (OUTPATIENT)
Dept: BARIATRICS/WEIGHT MGMT | Facility: CLINIC | Age: 39
End: 2019-04-03

## 2019-04-03 VITALS
SYSTOLIC BLOOD PRESSURE: 132 MMHG | TEMPERATURE: 98.1 F | DIASTOLIC BLOOD PRESSURE: 89 MMHG | HEART RATE: 69 BPM | HEIGHT: 67 IN | OXYGEN SATURATION: 99 % | BODY MASS INDEX: 34.61 KG/M2 | WEIGHT: 220.5 LBS | RESPIRATION RATE: 18 BRPM

## 2019-04-03 DIAGNOSIS — R53.83 FATIGUE, UNSPECIFIED TYPE: ICD-10-CM

## 2019-04-03 DIAGNOSIS — Z13.21 MALNUTRITION SCREEN: ICD-10-CM

## 2019-04-03 DIAGNOSIS — K91.2 POSTGASTRECTOMY MALABSORPTION: ICD-10-CM

## 2019-04-03 DIAGNOSIS — E66.9 OBESITY, CLASS I, BMI 30-34.9: ICD-10-CM

## 2019-04-03 DIAGNOSIS — E61.1 IRON DEFICIENCY: ICD-10-CM

## 2019-04-03 DIAGNOSIS — Z90.3 POSTGASTRECTOMY MALABSORPTION: ICD-10-CM

## 2019-04-03 DIAGNOSIS — Z98.84 STATUS POST BARIATRIC SURGERY: Primary | ICD-10-CM

## 2019-04-03 DIAGNOSIS — Z13.0 SCREENING, IRON DEFICIENCY ANEMIA: ICD-10-CM

## 2019-04-03 PROCEDURE — 99214 OFFICE O/P EST MOD 30 MIN: CPT | Performed by: PHYSICIAN ASSISTANT

## 2019-04-03 NOTE — PROGRESS NOTES
"[facility] Bariatric Surgery  [address]  [facility] [id number] [phone]        Patient Name:  Lindsey Sprague.  :  1980        Reason for Visit:   Annual Eval  19 months postop    HPI: Lindsey Sprague is a 38 y.o. female s/p LSG with GDW (previous AGB) on 17, s/p lap arsenio/ EGD with Dr. Campuzano 10/22/18    Doing fine. Discouraged that she continues to be >200lb.  Feeling much better from GI standpoint since lap arsenio.  No other issues/concerns. Feeling kind of tired, didn't notice much change in that after iron infusion.  Curious what blood work shows. Asking if her lipids, A1C can be checked. Denies dysphagia, reflux, nausea, vomiting and abdominal pain.  Not tracking, getting probably 70g prot/day, protein in smoothie as only supplement. Coffee in am, smoothie (greek yorgut, berries, almond milk, greens, protein). Lunch: healthy choice or leftovers.   Dinner: spaghetti/ salad. \"Snacks on garbage\".  Unsure of calories.  Drinking 64 fluid oz/day.  Last labs revealed ferritin 6, iron 42- advised iron infusion, otherwise advised cont vitamins.  Had iron infusion 10/2018. Not taking vitamins, occ MVI patch- couple times a week.  Not taking antacid.    Exercising- walking/ running, some strength training, training for 10k train run.     Presurgery weight: 274 pounds.  Today's weight is 100 kg (220 lb 8 oz) pounds, today's  Body mass index is 34.54 kg/m²., and her weight loss since surgery is 54 pounds.      Past Medical History:   Diagnosis Date   • Anxiety    • Depression    • Dyspepsia     serum h. pyl neg 2017   • Dyspnea on exertion    • Elevated HDL     81   • Elevated LDL cholesterol level     102   • Fatigue    • Heart murmur     asx   • Heartburn     denies further issue s/p LSG - poss recurrent HH on EGD 2017 GDW   • Hypertension    • Iron deficiency     s/p IV iron 10/2018   • Menstrual irregularity    • Morbid obesity (CMS/HCC)    • Polycystic " "ovarian syndrome    • Wears eyeglasses      Past Surgical History:   Procedure Laterality Date   • ENDOSCOPY N/A 9/8/2017    Procedure: ESOPHAGOGASTRODUODENOSCOPY;  Surgeon: Karthik Campuzano MD;  Location:  [facility];  Service:    • GASTRIC BANDING REMOVAL  2017    s/p AGBR 2/6/17 by GDW for chronic pain/nausea/pouch enlargement   • GASTRIC SLEEVE LAPAROSCOPIC N/A 9/8/2017    Procedure: GASTRIC SLEEVE LAPAROSCOPIC;  Surgeon: Karthik Campuzano MD;  Location:  [facility];  Service:    • LAPAROSCOPIC GASTRIC BANDING  2011    s/p LAGB APS w/ HHR 3/2011 by W   • TONSILLECTOMY  1994     Outpatient Medications Marked as Taking for the 4/3/19 encounter (Office Visit) with Beatriz Flores PA-C   Medication Sig Dispense Refill   • hydrOXYzine (VISTARIL) 25 MG capsule 25 mg As Needed.     • melatonin 5 MG tablet tablet Take 5 mg by mouth Every Night.     • traZODone (DESYREL) 100 MG tablet Take 100 mg by mouth Every Night.         No Known Allergies    Social History     Socioeconomic History   • Marital status:      Spouse name: Not on file   • Number of children: Not on file   • Years of education: Not on file   • Highest education level: Not on file   Tobacco Use   • Smoking status: Never Smoker   • Smokeless tobacco: Never Used   Substance and Sexual Activity   • Alcohol use: No   • Drug use: No   Social History Narrative    .  Lives in [facility] w/  and 3 children.   at [facility].         /89 (BP Location: Left arm, Patient Position: Sitting, Cuff Size: Large Adult)   Pulse 69   Temp 98.1 °F (36.7 °C) (Temporal)   Resp 18   Ht 170.2 cm (67\")   Wt 100 kg (220 lb 8 oz)   SpO2 99%   BMI 34.54 kg/m²     Physical Exam   Constitutional: She is oriented to person, place, and time. She appears well-developed and well-nourished.   HENT:   Head: Normocephalic and atraumatic.   Cardiovascular: Normal rate, regular rhythm and normal heart sounds. "   Pulmonary/Chest: Effort normal and breath sounds normal. No respiratory distress. She has no wheezes.   Abdominal: Soft. Bowel sounds are normal. She exhibits no distension. There is no tenderness.   Incisions well healed   Neurological: She is alert and oriented to person, place, and time.   Skin: Skin is warm and dry.   Psychiatric: She has a normal mood and affect. Her behavior is normal. Judgment and thought content normal.         Assessment:  19 months s/p LSG with GDW (previous AGB) on 9/8/17, s/p lap arsenio/ EGD with Dr. Campuzano 10/22/18      ICD-10-CM ICD-9-CM   1. Status post bariatric surgery Z98.84 V45.86   2. Screening, iron deficiency anemia Z13.0 V78.0   3. Malnutrition screen Z13.21 V77.2   4. 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 Fatigue, unspecified type R53.83 780.79   6. Obesity, Class I, BMI 30-34.9 E66.9 278.00   7. Iron deficiency E61.1 280.9         Plan:  Discussed ways to improve routine diet, limit sugar/ carbs, increase protein to 100g daily. Restart tracking intake. Offered BMR and dietician consult. Focus on good food choices and healthy habits. Focus on high protein, low carb.   Continue routine exercise, encouraged strength training.  Routine bariatric labs ordered.  Continue vitamins w/ adjustments pending lab results.  Call w/ problems/concerns.     The patient was instructed to follow up in 6 months, sooner if needed.      Total time spent w/ patient 25 minutes and 15 minutes spent counseling the patient on nutrition and necessary dietary/lifestyle modifications.

## 2019-04-05 LAB
25(OH)D3+25(OH)D2 SERPL-MCNC: 25 NG/ML
ALBUMIN SERPL-MCNC: 4.26 G/DL (ref 3.2–4.8)
ALBUMIN/GLOB SERPL: 1.8 G/DL (ref 1.5–2.5)
ALP SERPL-CCNC: 86 U/L (ref 25–100)
ALT SERPL-CCNC: 20 U/L (ref 7–40)
AST SERPL-CCNC: 22 U/L (ref 0–33)
BASOPHILS # BLD AUTO: 0.02 10*3/MM3 (ref 0–0.2)
BASOPHILS NFR BLD AUTO: 0.3 % (ref 0–1)
BILIRUB SERPL-MCNC: 0.7 MG/DL (ref 0.3–1.2)
BUN SERPL-MCNC: 14 MG/DL (ref 9–23)
BUN/CREAT SERPL: 17.9 (ref 7–25)
CALCIUM SERPL-MCNC: 9.3 MG/DL (ref 8.7–10.4)
CHLORIDE SERPL-SCNC: 107 MMOL/L (ref 99–109)
CHOLEST SERPL-MCNC: 158 MG/DL (ref 0–200)
CO2 SERPL-SCNC: 26 MMOL/L (ref 20–31)
CREAT SERPL-MCNC: 0.78 MG/DL (ref 0.6–1.3)
EOSINOPHIL # BLD AUTO: 0.15 10*3/MM3 (ref 0–0.3)
EOSINOPHIL NFR BLD AUTO: 2 % (ref 0–3)
ERYTHROCYTE [DISTWIDTH] IN BLOOD BY AUTOMATED COUNT: 13.3 % (ref 11.3–14.5)
FERRITIN SERPL-MCNC: 25 NG/ML (ref 10–291)
FOLATE SERPL-MCNC: 5.88 NG/ML (ref 3.2–20)
GLOBULIN SER CALC-MCNC: 2.3 GM/DL
GLUCOSE SERPL-MCNC: 87 MG/DL (ref 70–100)
HCT VFR BLD AUTO: 43.6 % (ref 34.5–44)
HDLC SERPL-MCNC: 66 MG/DL (ref 40–60)
HGB BLD-MCNC: 14.8 G/DL (ref 11.5–15.5)
IMM GRANULOCYTES # BLD AUTO: 0.01 10*3/MM3 (ref 0–0.05)
IMM GRANULOCYTES NFR BLD AUTO: 0.1 % (ref 0–0.6)
IRON SERPL-MCNC: 108 MCG/DL (ref 50–175)
LDLC SERPL CALC-MCNC: 82 MG/DL (ref 0–100)
LYMPHOCYTES # BLD AUTO: 2.75 10*3/MM3 (ref 0.6–4.8)
LYMPHOCYTES NFR BLD AUTO: 36.5 % (ref 24–44)
Lab: NORMAL
MCH RBC QN AUTO: 28.9 PG (ref 27–31)
MCHC RBC AUTO-ENTMCNC: 33.9 G/DL (ref 32–36)
MCV RBC AUTO: 85.2 FL (ref 80–99)
METHYLMALONATE SERPL-SCNC: 298 NMOL/L (ref 0–378)
MONOCYTES # BLD AUTO: 0.64 10*3/MM3 (ref 0–1)
MONOCYTES NFR BLD AUTO: 8.5 % (ref 0–12)
NEUTROPHILS # BLD AUTO: 3.97 10*3/MM3 (ref 1.5–8.3)
NEUTROPHILS NFR BLD AUTO: 52.6 % (ref 41–71)
PLATELET # BLD AUTO: 271 10*3/MM3 (ref 150–450)
POTASSIUM SERPL-SCNC: 4.4 MMOL/L (ref 3.5–5.5)
PREALB SERPL-MCNC: 25 MG/DL (ref 14–35)
PROT SERPL-MCNC: 6.6 G/DL (ref 5.7–8.2)
RBC # BLD AUTO: 5.12 10*6/MM3 (ref 3.89–5.14)
SODIUM SERPL-SCNC: 138 MMOL/L (ref 132–146)
TRIGL SERPL-MCNC: 48 MG/DL (ref 0–150)
VIT B1 BLD-SCNC: 108.1 NMOL/L (ref 66.5–200)
VLDLC SERPL CALC-MCNC: 9.6 MG/DL
WBC # BLD AUTO: 7.54 10*3/MM3 (ref 3.5–10.8)

## 2019-11-25 NOTE — PROGRESS NOTES
[facility] BARIATRIC SURGERY  [address]  [facility] [id number]  [phone]      Patient  Name:  Lindsey Sprague.  :  1980      Date of Visit: 2017      Chief Complaint:  weight gain; unable to maintain weight loss.  Preop LSG    History of Present Illness:  Lindsey Sprague is a 37 y.o. female who presents today for evaluation, education and consultation regarding weight loss surgery. The patient is interested in sleeve gastrectomy.     s/p LAGB APS w/ HHR 3/2011 by GDW followed by LAGBR by Dr. Campuzano on 17 for chronic pain/nausea w/ pouch enlargement.  Pain immediately resolved w/ band removal.  Unfortunately gained 34 lbs since band removed. Now pursuing revision.    Linsdey has been overweight for at least 25 years, has been 35 pounds or more overweight for at least 20 years and started dieting at age 10.  Lost and maintained 50 lbs w/ lapband.    Returns for final visit prior to LSG.  Orig wanted LSG but ins wouldn't cover it at the time.  Pt is aware that LSG after prev AGB/AGBR carries increased risk over primary LSG alone, kenya wrt bleeding, infection, leak, prolonged OR times with incr risk pulm complications and VTE, etc and still wishes to proceed.  At the time of her banding I did a full hiatal dissection and 3 stitch post HHR.  Says since AGBR only rare SHELBIE such as when eats red sauces.      Past Medical History:   Diagnosis Date   • Depression    • Dyspepsia     Denies GB sx's.  serum h. pyl neg   • Dyspnea on exertion    • Elevated HDL     81   • Elevated LDL cholesterol level     102   • Fatigue    • Heart murmur     heard on exam today, no hx, asx   • Heartburn     infrequent, no meds, poss recurrent HH on EGD 2017 GDW   • Hypertension    • Menstrual irregularity    • Morbid obesity    • Polycystic ovarian syndrome      Past Surgical History:   Procedure Laterality Date   • GASTRIC BANDING REMOVAL  2017    s/p AGBR 17 by GDW for chronic  LOW-TENSION GLAUCOMA-  Discussed findings of exam in detail with the patient. -  Discussed the risk of permanent vision loss and blindness associated with glaucoma. -  Discussed the need for lifelong monitoring to detect signs of glaucoma.-Discussed the chronic progressive nature of this disease and various treatment options.-Importance of good compliance with medications was emphasized. - IOP's today stable ou- Cont. Combigan OU BID.- Cont. Latanoprost OU QHS.- S/P repeat SLT ODDDEIDRE s -Stressed the importance of keeping blood sugars under control blood pressure under control and weight normalization and regular visits with PCP. -Explained the possible effects of poorly controlled diabetes and the damage that diabetes can cause to ocular health. -Patient to check HgbA1C.-Pt instructed to contact our office with any vision changes. KEVON-  Discussed findings of exam in detail with the patient. -  Discussed the chronic nature and treatment options with the patient. -  Continue Systane Ultra QID/prn. Recommend using more frequently and on a regular basis. Cataract OU-Reviewed symptoms of advancing cataract growth such as glare and halos and decreased vision.-Continue to monitor for now.  Pt will notify us if any new symptoms develop.; Dr's Notes: VF- 8/13/18[facility] OCT - 2/28/18 pain/nausea/pouch enlargement   • LAPAROSCOPIC GASTRIC BANDING  2011    s/p LAGB APS w/ HHR 3/2011 by GDW   • TONSILLECTOMY  1994       No Known Allergies      Current Outpatient Prescriptions:   •  buPROPion XL (WELLBUTRIN XL) 300 MG 24 hr tablet, Take 300 mg by mouth Daily., Disp: , Rfl:   •  busPIRone (BUSPAR) 10 MG tablet, Take 10 mg by mouth As Needed., Disp: , Rfl:   •  FLUoxetine (PROzac) 40 MG capsule, Take 40 mg by mouth Daily., Disp: , Rfl:   •  hydrochlorothiazide (HYDRODIURIL) 25 MG tablet, Take 25 mg by mouth Daily., Disp: , Rfl:   •  levonorgestrel (MIRENA) 20 MCG/24HR IUD, 1 each by Intrauterine route 1 (One) Time., Disp: , Rfl:   •  losartan (COZAAR) 25 MG tablet, Take 25 mg by mouth Daily., Disp: , Rfl:   •  traZODone (DESYREL) 100 MG tablet, Take 100 mg by mouth Every Night., Disp: , Rfl:     Social History     Social History   • Marital status:      Spouse name: N/A   • Number of children: N/A   • Years of education: N/A     Occupational History   • Not on file.     Social History Main Topics   • Smoking status: Never Smoker   • Smokeless tobacco: Not on file   • Alcohol use No   • Drug use: Not on file   • Sexual activity: Not on file     Other Topics Concern   • Not on file     Social History Narrative    .  Lives in [facility] w/  and 3 children.   at [facility].       Family History   Problem Relation Age of Onset   • Hypertension Mother    • Breast cancer Paternal Grandmother      Review of Systems:  Constitutional:  The patient reports fatigue, weight gain and denies fevers and chills.  Cardiovascular:  The patient reports HTN and denies heart disease and DVT.  Respiratory:  The patient denies asthma, apnea and PE.  Gastrointestinal:  The patient reports heartburn and denies liver disease.  Genitourinary:  The patient denies renal insufficiency.    Musculoskeletal:  The patient denies joint pain, fibromyalgia and  arthritis.  Neurological:  The patient denies seizure and stroke.  Psychiatric:  The patient reports anxiety, depression and denies bipolar disorder.  Endocrine:  The patient denies diabetes and thyroid disease.  Hematologic:  The patient denies bleeding disorder.  Skin:  The patient denies MRSA.    Physical Exam:  Vital Signs:  Weight: 274 lb 8 oz (125 kg)   Body mass index is 41.74 kg/(m^2).  Temp: 97.5 °F (36.4 °C)   Heart Rate: 77   BP: 125/80     Physical Exam   Constitutional: She is oriented to person, place, and time. She appears well-developed and well-nourished.   HENT:   Head: Normocephalic and atraumatic.   Eyes: Conjunctivae are normal. No scleral icterus.   Neck: Neck supple. Carotid bruit is not present. No thyromegaly present.   Cardiovascular: Normal rate and regular rhythm.    Murmur heard.   Systolic murmur is present with a grade of 2/6   Left 2nd ICS   Pulmonary/Chest: Effort normal and breath sounds normal. No respiratory distress. She has no wheezes. She has no rales.   Abdominal: Soft. Bowel sounds are normal. She exhibits no distension and no mass. There is no hepatosplenomegaly. There is no tenderness. No hernia.   scars:  AGB/AGBR scars   Musculoskeletal: Normal range of motion. She exhibits no edema.   Neurological: She is alert and oriented to person, place, and time. Gait normal.   Skin: Skin is warm and dry. No rash noted.   Psychiatric: She has a normal mood and affect. Judgment normal.   Vitals reviewed.         Patient Active Problem List   Diagnosis   • Fatigue   • Heartburn   • Hypertension   • Polycystic ovarian syndrome   • Depression   • Dyspepsia   • Dyspnea on exertion   • Morbid obesity   • Menstrual irregularity     Psych Brown 5/17 approp  Saman:  HC x 2, phen x 1    Assessment:    Lindsey Sprague is a 37 y.o. year old female with medically complicated obesity pursuing sleeve gastrectomy.    Weight loss surgery is deemed medically necessary given the following obesity  "related comorbidities including hypertension and polycystic ovarian disease with current Weight: 274 lb 8 oz (125 kg) and Body mass index is 41.74 kg/(m^2)..    Patient is aware that surgery is a tool, and that weight loss is not guaranteed but only seen in the context of appropriate use, follow up and exercise.    Complications  of laparoscopic/possible robotic gastric sleeve were discussed. The patient is well aware of the potential complications of surgery that include but not limited to bleeding, infections, deep venous thrombosis, pulmonary embolism, pulmonary complications such as pneumonia, cardiac events, hernias, small bowel obstruction, damage to the spleen or other organs, bowel injury, disfiguring scars, failure to lose weight, need for additional surgery, conversion to an open procedure, and death. Patient is also aware of complications which apply in this particular procedure that can include but are not limited to a \"leak\" at the staple line which in some instances may require conversion to gastric bypass.    Greater than 10 minutes was spent with the patient discussing avoiding all tobacco products and second hand smoke at least 2 weeks pre-operatively and 6 weeks post-operatively to minimize the risk of sleeve leak     Discussed the risks, benefits and alternative therapies at great length as outlined in our extensive consent forms, consent videos, and educational teaching process under the direction of the center's .    A copy of the patient's signed informed consent is on file.      Plan:  The consultation plan and program requirements were reviewed with the patient.  The patient has been advised that a letter of medical support must be obtained from her primary care physician or referring provider. A psychological evaluation will be arranged.  A nutritional evaluation will be performed.  The patient was advised to start a high protein and low carbohydrate diet.  Necessary " lifestyle modifications were discussed.  Instructions on how to access YOSEF was given to the patient.  YOSEF is an internet based educational video that explains the surgical procedure chosen and answers basic questions regarding that procedure.     Preoperative testing will include: CBC, CMP, Fasting Lipids, TSH, HgA1C, H.Pylori, CXR and EKG     Additional preop clearances required prior to surgery: None.      Patient understands that bariatric surgery is not cosmetic surgery but rather a tool to help make a lifelong commitment to lifestyle changes including diet, exercise, behavior modifications, and healthy habits.      Karthik Campuzano MD

## 2021-08-18 ENCOUNTER — OFFICE VISIT (OUTPATIENT)
Dept: BARIATRICS/WEIGHT MGMT | Facility: CLINIC | Age: 41
End: 2021-08-18

## 2021-08-18 VITALS
OXYGEN SATURATION: 98 % | HEART RATE: 65 BPM | BODY MASS INDEX: 39.31 KG/M2 | TEMPERATURE: 97.4 F | SYSTOLIC BLOOD PRESSURE: 116 MMHG | DIASTOLIC BLOOD PRESSURE: 66 MMHG | HEIGHT: 67 IN | WEIGHT: 250.5 LBS | RESPIRATION RATE: 18 BRPM

## 2021-08-18 DIAGNOSIS — E66.9 OBESITY, CLASS II, BMI 35-39.9: ICD-10-CM

## 2021-08-18 DIAGNOSIS — R10.13 DYSPEPSIA: ICD-10-CM

## 2021-08-18 DIAGNOSIS — Z98.84 STATUS POST BARIATRIC SURGERY: Primary | ICD-10-CM

## 2021-08-18 PROCEDURE — 99215 OFFICE O/P EST HI 40 MIN: CPT | Performed by: PHYSICIAN ASSISTANT

## 2021-08-18 RX ORDER — HYDROCHLOROTHIAZIDE 25 MG/1
25 TABLET ORAL DAILY
COMMUNITY
Start: 2021-06-30

## 2021-08-18 RX ORDER — BUSPIRONE HYDROCHLORIDE 15 MG/1
15 TABLET ORAL 2 TIMES DAILY
COMMUNITY
Start: 2021-06-29

## 2021-08-18 RX ORDER — VILAZODONE HYDROCHLORIDE 40 MG/1
40 TABLET ORAL DAILY
COMMUNITY
Start: 2021-07-12

## 2021-08-18 NOTE — PROGRESS NOTES
[facility] BARIATRIC SURGERY  [address]  [address]  [facility] [address] [phone]      Patient  Name:  Lindsey Sprague  :  1980        Chief Complaint:  weight gain; unable to maintain weight loss    History of Present Illness:  Lindsey Sprague is a 41 y.o. female s/p LSG with GDW (previous AGB) on 17, s/p lap arsenio/ EGD with Dr. Campuzano 10/22/18 who presents today for evaluation, education and consultation regarding bariatric and metabolic surgery. The patient is interested in Revision.     Lindsey has been overweight for at least 20 years, has been 35 pounds or more overweight for at least 20 years, started dieting at age 11.        As above, patient has been overweight for many years, with numerous failed dietary/weight loss attempts.  She now has obesity related comorbidities and as such has decided to pursue weight loss surgery.    Previous diet attempts include: High Protein, Low Carbohydrate, Isabela's Diet and Fasting; Weight Watchers; Wellbutrin and Amphetamines.  The most weight Lindsey lost was 45 pounds on sleeve but was unable to maintain that weight loss . Start weight 274. Current weight 250lb. 24lb down.     She presents today to discuss revision, considering SIPS if an options. Says christina Notes restriction with her sleeve but did not get down past 220lb, even with working really hard on good diet plan and intensive exercise. She feels malabsoorption may help her lose weight more effectively than with restriction. Says she grazes a lot.  Doesn't make great choices all the time right now.  Drinking water, coffee with creamer. Hasn't been avoiding HFCS. Was running regularly. Denies any GI issues. No reflux, not on antacid. Is s/p arsenio.     Otherwise h/o heart murmur, HTN, preDM, PCOS, LE edema, anxiety/ depression.     All other past medical, surgical, social and family history have been obtained and discussed as pertinent to bariatric surgery as below.      Past Medical History:   Diagnosis Date   • Anxiety    • Depression    • Dyspepsia     serum h. pyl neg 5/2017   • Dyspnea on exertion    • Elevated HDL     81   • Elevated LDL cholesterol level     102   • Fatigue    • Heart murmur     asx   • Heartburn     denies further issue s/p LSG - poss recurrent HH on EGD 1/2017 [facility]DW   • Hypertension    • Iron deficiency     s/p IV iron 10/2018   • Joint pain     not taking meds   • Lower extremity edema    • Menstrual irregularity    • Morbid obesity (CMS/HCC)    • Polycystic ovarian syndrome    • Prediabetes    • Wears eyeglasses      Past Surgical History:   Procedure Laterality Date   • ENDOSCOPY N/A 9/8/2017    Procedure: ESOPHAGOGASTRODUODENOSCOPY;  Surgeon: Karthik Campuzano MD;  Location:  [facility];  Service:    • ENDOSCOPY  10/22/2018   • GASTRIC BANDING REMOVAL  2017    s/p AGBR 2/6/17 by GDW for chronic pain/nausea/pouch enlargement   • GASTRIC SLEEVE LAPAROSCOPIC N/A 9/8/2017    Procedure: GASTRIC SLEEVE LAPAROSCOPIC;  Surgeon: Karthik Campuzano MD;  Location:  [facility];  Service:    • LAPAROSCOPIC CHOLECYSTECTOMY  10/22/2018   • LAPAROSCOPIC GASTRIC BANDING  2011    s/p LAGB APS w/ HHR 3/2011 by GDW   • TONSILLECTOMY  1994       No Known Allergies    Current Outpatient Medications:   •  busPIRone (BUSPAR) 15 MG tablet, Take 15 mg by mouth 2 (two) times a day., Disp: , Rfl:   •  hydroCHLOROthiazide (HYDRODIURIL) 25 MG tablet, Take 25 mg by mouth Daily., Disp: , Rfl:   •  hydrOXYzine (VISTARIL) 25 MG capsule, 25 mg As Needed., Disp: , Rfl:   •  melatonin 5 MG tablet tablet, Take 5 mg by mouth Every Night., Disp: , Rfl:   •  traZODone (DESYREL) 100 MG tablet, Take 100 mg by mouth Every Night., Disp: , Rfl:   •  Viibryd 40 MG tablet tablet, Take 40 mg by mouth Daily., Disp: , Rfl:     Social History     Socioeconomic History   • Marital status:      Spouse name: Not on file   • Number of children: Not on file   • Years of education: Not on file   •  Highest education level: Not on file   Tobacco Use   • Smoking status: Never Smoker   • Smokeless tobacco: Never Used   Vaping Use   • Vaping Use: Never used   Substance and Sexual Activity   • Alcohol use: Yes     Comment: occasional   • Drug use: No     Family History   Problem Relation Age of Onset   • Hypertension Mother    • Breast cancer Paternal Grandmother        Review of Systems:  Constitutional:  Reports fatigue, weight gain and denies fevers, chills.  HEENT:  denies headache, ear pain or loss of hearing, blurred or double vision, nasal discharge or sore throat.  Cardiovascular:  Reports HTN, heart murmur, edema and denies HLD, CAD, Atrial Fib, hx heart disease, hx MI, chest pain, palpitations, hx DVT.  Respiratory:  Reports shortness of breath  and denies cough , wheezing, sleep apnea, asthma, hx PE.  Gastrointestinal:  Reports gallbladder issues and denies dysphagia, heartburn, nausea, vomiting, abdominal pain, IBS, diarrhea, constipation, melena, blood in stool, liver disease, hx pancreatitis.  Genitourinary:  Reports none and denies history of  frequent UTI, incontinence, hematuria, dysuria, polyuria, polydipsia, renal insufficiency, renal failure.    Musculoskeletal:  Reports joint pain and denies fibromyalgia, arthritis and autoimmune disease.  Neurological:  Reports none and denies headaches, migraines, numbness /tingling, dizziness, confusion, seizure, stroke.  Psychiatric:  Reports hx depression, hx anxiety and denies bipolar disorder, suicidal ideation, hx suicide attempt, hx self injury, hx substance abuse, eating disorder.  Endocrine:  Reports PCOS, preDM and denies thyroid disease, gout.  Hematologic:  Reports none and denies bruising, bleeding disorder, hx anemia, hx blood transfusion.  Skin:  Reports none and denies rashes, hx MRSA.      Physical Exam:  Vital Signs:  Weight: 114 kg (250 lb 8 oz)   Body mass index is 39.23 kg/m².  Temp: 97.4 °F (36.3 °C)   Heart Rate: 65   BP: 116/66      Physical Exam  Vitals reviewed.   Constitutional:       Appearance: She is well-developed. She is obese.   HENT:      Head: Normocephalic.   Neck:      Thyroid: No thyromegaly.   Cardiovascular:      Rate and Rhythm: Normal rate and regular rhythm.      Heart sounds: Normal heart sounds.   Pulmonary:      Effort: Pulmonary effort is normal. No respiratory distress.      Breath sounds: Normal breath sounds. No wheezing.   Abdominal:      General: Bowel sounds are normal. There is no distension.      Palpations: Abdomen is soft.      Tenderness: There is no abdominal tenderness.      Comments: Lap scars   Musculoskeletal:         General: Normal range of motion.      Cervical back: Normal range of motion and neck supple.   Skin:     General: Skin is warm and dry.   Neurological:      Mental Status: She is alert and oriented to person, place, and time.   Psychiatric:         Mood and Affect: Mood normal.         Behavior: Behavior normal.         Thought Content: Thought content normal.         Judgment: Judgment normal.         Patient Active Problem List   Diagnosis   • Fatigue   • Heartburn   • Hypertension   • Polycystic ovarian syndrome   • Depression   • Dyspepsia   • Dyspnea on exertion   • Menstrual irregularity   • Iron deficiency   • Poor iron absorption   • Heart murmur   • Anxiety   • Lower extremity edema   • Prediabetes       Assessment:    Lindsey Sprague is a 41 y.o. year old female with medically complicated obesity pursuing Revision.    Weight loss surgery is deemed medically necessary given the following obesity related comorbidities including hypertension with current Weight: 114 kg (250 lb 8 oz) and Body mass index is 39.23 kg/m²..    Plan:  The consultation plan and program requirements were reviewed with the patient.  The patient has been advised that a letter of medical support must be obtained from her primary care physician or referring provider. A psychological evaluation will be  arranged.  A nutritional evaluation will be performed.  The patient was advised to start a high protein and low carbohydrate diet.  Necessary lifestyle modifications were discussed.  Instructions on how to access YOSEF was given to the patient.  YOSEF is an internet based educational video that explains the surgical procedure chosen and answers basic questions regarding that procedure.     Patient's Body mass index is 39.23 kg/m². indicating that she is morbidly obese (BMI > 40 or > 35 with obesity - related health condition). Obesity-related health conditions include the following: hypertension. Obesity is worsening. BMI is is above average; BMI management plan is completed. We discussed consulting a Bariatric surgeon..      Will proceed with UGI and EGD for evaluation. The risks and benefits of the upper endoscopy were discussed with the patient in detail and all questions were answered.  Possibility of perforation, bleeding, aspiration, and anesthesia reaction were reviewed.  Patient agrees to proceed.    Should pt be a candidate for revision surgery, further Preoperative testing will include: CBC, CMP, Lipids, TSH, HgA1C, H.Pylori UBT, EKG, CXR.     Additional preop clearances required prior to surgery: Cardiology.      The patient has been educated on expected postoperative lifestyle changes, including commitment to high protein diet, vitamin regimen, and exercise program.  They are aware that support groups are encouraged for optimal weight loss results. Patient understands that bariatric surgery is not cosmetic surgery but rather a tool to help make a lifelong commitment to lifestyle changes including diet, exercise, behavior modifications, and healthy habits. The procedure was discussed with the patient and all questions were answered. The importance of avoiding ASA/ NSAIDS/ steroids/ tobacco/ hormones/ immunomodulators perioperatively was discussed.         Beatriz Flores PA-C    I spent 45 minutes caring  gilma Portillo on this date of service. This time includes time spent by me in the following activities: obtaining and/or reviewing a separately obtained history, performing a medically appropriate examination and/or evaluation, ordering medications, tests, or procedures and documenting information in the medical record.     Addendum-     UGI at [facility] 9/3/21 IMPRESSION:  Expected postsurgical changes seen from gastric sleeve.  There is no evidence of mucosal abnormality identified. No evidence of  hernia or reflux.    Will proceed with EGD for further evaluation.

## 2021-09-03 ENCOUNTER — HOSPITAL ENCOUNTER (OUTPATIENT)
Dept: GENERAL RADIOLOGY | Facility: HOSPITAL | Age: 41
Discharge: HOME | End: 2021-09-03
Admitting: PHYSICIAN ASSISTANT

## 2021-09-03 DIAGNOSIS — R10.13 DYSPEPSIA: ICD-10-CM

## 2021-09-03 PROCEDURE — 74240 X-RAY XM UPR GI TRC 1CNTRST: CPT

## 2021-09-03 RX ADMIN — BARIUM SULFATE 183 ML: 960 POWDER, FOR SUSPENSION ORAL at 09:12

## 2021-09-22 ENCOUNTER — TELEMEDICINE (OUTPATIENT)
Dept: BARIATRICS/WEIGHT MGMT | Facility: CLINIC | Age: 41
End: 2021-09-22

## 2021-09-22 DIAGNOSIS — K21.9 GASTROESOPHAGEAL REFLUX DISEASE, UNSPECIFIED WHETHER ESOPHAGITIS PRESENT: Primary | ICD-10-CM

## 2021-09-22 DIAGNOSIS — Z98.84 STATUS POST BARIATRIC SURGERY: ICD-10-CM

## 2021-09-22 PROCEDURE — 99214 OFFICE O/P EST MOD 30 MIN: CPT | Performed by: PHYSICIAN ASSISTANT

## 2021-09-22 NOTE — PROGRESS NOTES
[facility] Bariatric Surgery  [address]  [address]  [facility] [id number] [phone]        Patient Name:  Lindsey Sprague.  :  1980        Reason for Visit:   Weight gain, unable to maintain weightloss, evaluate for possible metabolic and bariatric surgery      HPI: Lindsey Sprague is a 41 y.o. female s/p LSG with GDW (previous AGB) on 17, s/p lap arsenio/ EGD with Dr. Campuzano 10/22/18 who presents for evaluation of reflux in preparation for bariatric and metabolic surgery, specifically revision with Dr. Campuzano.     Patient presents during the COVID-19 pandemic/federally declared state of public health emergency.  This service was conducted via [employer].  Patient is located [place].  Provider is located at her primary office location. The use of a video visit has been reviewed with the patient and verbal informed consent has been obtained.      Last office visit presented to discuss revision, considering SIPS if an options. Says hse Notes restriction with her sleeve but did not get down past 220lb, even with working really hard on good diet plan and intensive exercise. She feels malabsoorption may help her lose weight more effectively than with restriction. Says she grazes a lot.  Doesn't make great choices all the time right now.  Drinking water, coffee with creamer. Hasn't been avoiding HFCS. Was running regularly. Denies any GI issues. No reflux, not on antacid. Is s/p arsenio.      Otherwise h/o heart murmur, HTN, preDM, PCOS, LE edema, anxiety/ depression.      Recent imaging:     UGI at [facility] 9/3/21 IMPRESSION:  Expected postsurgical changes seen from gastric sleeve.  There is no evidence of mucosal abnormality identified. No evidence of  hernia or reflux.       Doing well.  No changes in medical history since last office visit. Has occasional heartburn which she takes tums prn. No other issues/concerns today. Denies dysphagia, nausea, vomiting, abdominal pain and pulmonary  issues.          Past Medical History:   Diagnosis Date   • Anxiety    • Depression    • Dyspepsia     serum h. pyl neg 5/2017   • Dyspnea on exertion    • Elevated HDL     81   • Elevated LDL cholesterol level     102   • Fatigue    • Heart murmur     asx   • Heartburn     denies further issue s/p LSG - poss recurrent HH on EGD 1/2017 [facility]   • Hypertension    • Iron deficiency     s/p IV iron 10/2018   • Joint pain     not taking meds   • Lower extremity edema    • Menstrual irregularity    • Morbid obesity (CMS/HCC)    • Polycystic ovarian syndrome    • Prediabetes    • Wears eyeglasses      Past Surgical History:   Procedure Laterality Date   • ENDOSCOPY N/A 9/8/2017    Procedure: ESOPHAGOGASTRODUODENOSCOPY;  Surgeon: Karthik Campuzano MD;  Location:  [facility];  Service:    • ENDOSCOPY  10/22/2018   • GASTRIC BANDING REMOVAL  2017    s/p AGBR 2/6/17 by GDW for chronic pain/nausea/pouch enlargement   • GASTRIC SLEEVE LAPAROSCOPIC N/A 9/8/2017    Procedure: GASTRIC SLEEVE LAPAROSCOPIC;  Surgeon: Karthik Campuzano MD;  Location:  [facility];  Service:    • LAPAROSCOPIC CHOLECYSTECTOMY  10/22/2018   • LAPAROSCOPIC GASTRIC BANDING  2011    s/p LAGB APS w/ HHR 3/2011 by GDW   • TONSILLECTOMY  1994     Outpatient Medications Marked as Taking for the 9/22/21 encounter (Telemedicine) with Beatriz Flores PA-C   Medication Sig Dispense Refill   • busPIRone (BUSPAR) 15 MG tablet Take 15 mg by mouth 2 (two) times a day.     • hydroCHLOROthiazide (HYDRODIURIL) 25 MG tablet Take 25 mg by mouth Daily.     • hydrOXYzine (VISTARIL) 25 MG capsule 25 mg As Needed.     • melatonin 5 MG tablet tablet Take 5 mg by mouth Every Night.     • traZODone (DESYREL) 100 MG tablet Take 100 mg by mouth Every Night.     • Viibryd 40 MG tablet tablet Take 40 mg by mouth Daily.         No Known Allergies    Social History     Socioeconomic History   • Marital status:      Spouse name: Not on file   • Number of children: Not on file   •  Years of education: Not on file   • Highest education level: Not on file   Tobacco Use   • Smoking status: Never Smoker   • Smokeless tobacco: Never Used   Vaping Use   • Vaping Use: Never used   Substance and Sexual Activity   • Alcohol use: Yes     Comment: occasional   • Drug use: No       There were no vitals taken for this visit.    Physical Exam  Constitutional:       Appearance: She is well-developed.   HENT:      Head: Normocephalic and atraumatic.   Pulmonary:      Effort: Pulmonary effort is normal.   Neurological:      Mental Status: She is alert and oriented to person, place, and time.   Psychiatric:         Thought Content: Thought content normal.           Assessment: s/p LSG with [facility] (previous AGB) on 9/8/17, s/p lap arsenio/ EGD with Dr. Campuzano 10/22/18 who presents for evaluation of reflux in preparation for bariatric and metabolic surgery, specifically revision with Dr. Campuzano.       ICD-10-CM ICD-9-CM   1. Gastroesophageal reflux disease, unspecified whether esophagitis present  K21.9 530.81         Plan:  Will proceed with EGD for further evaluation. The risks and benefits of the upper endoscopy were discussed with the patient in detail and all questions were answered.  Possibility of perforation, bleeding, aspiration, and anesthesia reaction were reviewed.  Patient agrees to proceed.           Statement Selected

## 2021-09-27 ENCOUNTER — OUTSIDE FACILITY SERVICE (OUTPATIENT)
Dept: BARIATRICS/WEIGHT MGMT | Facility: CLINIC | Age: 41
End: 2021-09-27

## 2021-09-27 ENCOUNTER — LAB REQUISITION (OUTPATIENT)
Dept: LAB | Facility: HOSPITAL | Age: 41
End: 2021-09-27

## 2021-09-27 DIAGNOSIS — K30 FUNCTIONAL DYSPEPSIA: ICD-10-CM

## 2021-09-27 PROCEDURE — 43239 EGD BIOPSY SINGLE/MULTIPLE: CPT | Performed by: SURGERY

## 2021-09-27 PROCEDURE — 88305 TISSUE EXAM BY PATHOLOGIST: CPT | Performed by: SURGERY

## 2021-09-28 LAB
CYTO UR: NORMAL
LAB AP CASE REPORT: NORMAL
LAB AP CLINICAL INFORMATION: NORMAL
PATH REPORT.FINAL DX SPEC: NORMAL
PATH REPORT.GROSS SPEC: NORMAL

## 2021-10-04 ENCOUNTER — CONSULT (OUTPATIENT)
Dept: BARIATRICS/WEIGHT MGMT | Facility: CLINIC | Age: 41
End: 2021-10-04

## 2021-10-04 VITALS
TEMPERATURE: 98 F | DIASTOLIC BLOOD PRESSURE: 68 MMHG | BODY MASS INDEX: 39.47 KG/M2 | HEIGHT: 67 IN | RESPIRATION RATE: 18 BRPM | SYSTOLIC BLOOD PRESSURE: 122 MMHG | WEIGHT: 251.5 LBS | OXYGEN SATURATION: 98 % | HEART RATE: 67 BPM

## 2021-10-04 DIAGNOSIS — R73.03 PREDIABETES: Primary | ICD-10-CM

## 2021-10-04 DIAGNOSIS — E28.2 POLYCYSTIC OVARIAN SYNDROME: ICD-10-CM

## 2021-10-04 DIAGNOSIS — F41.9 ANXIETY: ICD-10-CM

## 2021-10-04 DIAGNOSIS — R10.13 DYSPEPSIA: ICD-10-CM

## 2021-10-04 DIAGNOSIS — R12 HEARTBURN: ICD-10-CM

## 2021-10-04 DIAGNOSIS — Z98.84 STATUS POST BARIATRIC SURGERY: ICD-10-CM

## 2021-10-04 PROCEDURE — 99214 OFFICE O/P EST MOD 30 MIN: CPT | Performed by: SURGERY

## 2021-10-24 NOTE — PROGRESS NOTES
[facility]  [address]  [address]  [facility] [id number] [phone]      Patient  Name:  Lindsey Sprague  :  1980      Date of Visit: 10/4/21    Chief Complaint:  weight gain; unable to maintain weight loss.   Evaluate for possible revisional metabolic and bariatric surgery    History of Present Illness:  Lindsey Sprague is a 41 y.o. female who presents today for evaluation, education and consultation regarding revisional metabolic and bariatric surgery (MBS).  Since last seen 2021 she has gained 1 pound.  Most recent evaluation Beatriz Flores PA-C dated 2021 reviewed.    She noted the patient underwent sleeve gastrectomy with Dr. Campuzano on 2017 with a prior history of lap band and lap band removal and that Dr. Campuzano performed laparoscopic cholecystectomy and EGD on 10/22/2018 and presents with reflux and interested in revisional metabolic and bariatric surgery.  It was a video visit.  She noted on the last office visit the patient presented to discuss revision and was considering sips as an option and that she has restriction with her sleeve but could not get her weight below 220 pounds even with working really hard on a good diet plan and intensive exercise and feels that malabsorption may help her lose weight more effectively than restriction and that she grazes a lot and does not make great choices all the time right now drinks water coffee with creamer and has not been avoiding high fructose corn syrup was previously running regularly no reflux or other GI issues and has a history of heart murmur hypertension prediabetes PCOS lower extreme edema anxiety and depression and that upper GI at [facility] on 9/3/2021 showed postoperative changes of sleeve gastrectomy no hiatal hernia or reflux.      The patient has had issues with morbid obesity for years and only temporary success with surgical and non-surgical methods of weight  loss.  The patient is seeking revisional metabolic and bariatric surgery to help with the morbid obesity related conditions of anxiety and depression, dyspnea on exertion, dyslipidemia, fatigue, heart murmur, hypertension, iron deficiency anemia, joint pain, peripheral edema menstrual irregularities, PCOS, prediabetes.    41-year-old morbidly obese female from [facility].  I originally performed her LAP-BAND and 3 stitch posterior hiatal hernia repair in March 2011 and remove the band for intolerance in February 2017.  She says on 8/19/2010 she weighed 266 pounds and had a BMI of 40.4.  At the time of her preoperative evaluation for sleeve gastrectomy in August 2017 she weighed 274-1/2 pounds and had a BMI of 41.74.  She says her lowest weight was 215 pounds.  She gets heartburn infrequently and takes Tums as needed usually less than once a week.  Her insurance is through her  who works for [employer] and she thinks it is a Blue [employer] Blue [employer] policy.  Upper GI as above.  I did her sleeve over 36 French bougie dilator.  EGD on 9/27/2021 showed a fairly unremarkable post sleeve upper endoscopy with perhaps slight nonobstructing narrowing in the proximal sleeve, no recurrent hiatal hernia appreciated.  Z-line 35 cm.  No bile reflux gastritis.  Mild pyloric spasm noted without deformity.  Biopsies of the antrum showed antral type mucosa with reactive changes negative for H. pylori and distal esophageal biopsies showed squamous mucosa with reactive changes otherwise unremarkable.  Color photos reviewed as well.      Past Medical History:   Diagnosis Date   • Anxiety    • Depression    • Dyspepsia     serum h. pyl neg 5/2017   • Dyspnea on exertion    • Elevated HDL     81   • Elevated LDL cholesterol level     102   • Fatigue    • Heart murmur     asx   • Heartburn     denies further issue s/p LSG - poss recurrent HH on EGD 1/2017 [facility]   • Hypertension    • Iron deficiency     s/p IV iron 10/2018    • Joint pain     not taking meds   • Lower extremity edema    • Menstrual irregularity    • Morbid obesity (HCC)    •  (normal spontaneous vaginal delivery)     x2, preeclampsia requiring early delivery   • Polycystic ovarian syndrome    • Prediabetes    • Wears eyeglasses      Past Surgical History:   Procedure Laterality Date   • ENDOSCOPY N/A 2017    Procedure: ESOPHAGOGASTRODUODENOSCOPY;  Surgeon: Karthik Campuzano MD;  Location:  [facility];  Service:    • ENDOSCOPY  10/22/2018   • GASTRIC BANDING REMOVAL      s/p AGBR 17 by [facility] for chronic pain/nausea/pouch enlargement   • GASTRIC SLEEVE LAPAROSCOPIC N/A 2017    Procedure: GASTRIC SLEEVE LAPAROSCOPIC;  Surgeon: Karthik Campuzano MD;  Location:  [facility];  Service:    • LAPAROSCOPIC CHOLECYSTECTOMY  10/22/2018   • LAPAROSCOPIC GASTRIC BANDING      s/p LAGB APS w/ HHR 3/2011 by [facility]   • TONSILLECTOMY         No Known Allergies    Current Outpatient Medications:   •  busPIRone (BUSPAR) 15 MG tablet, Take 15 mg by mouth 2 (two) times a day., Disp: , Rfl:   •  hydroCHLOROthiazide (HYDRODIURIL) 25 MG tablet, Take 25 mg by mouth Daily., Disp: , Rfl:   •  hydrOXYzine (VISTARIL) 25 MG capsule, 25 mg As Needed., Disp: , Rfl:   •  melatonin 5 MG tablet tablet, Take 5 mg by mouth Every Night., Disp: , Rfl:   •  traZODone (DESYREL) 100 MG tablet, Take 100 mg by mouth Every Night., Disp: , Rfl:   •  Viibryd 40 MG tablet tablet, Take 40 mg by mouth Daily., Disp: , Rfl:     Social History     Socioeconomic History   • Marital status:    Tobacco Use   • Smoking status: Never Smoker   • Smokeless tobacco: Never Used   Vaping Use   • Vaping Use: Never used   Substance and Sexual Activity   • Alcohol use: Yes     Comment: occasional   • Drug use: No     Family History   Problem Relation Age of Onset   • Hypertension Mother    • Breast cancer Paternal Grandmother        Review of Systems   Constitutional: Positive for fatigue and unexpected  weight gain. Negative for chills, diaphoresis, fever and unexpected weight loss.   HENT: Negative for congestion and facial swelling.    Eyes: Negative for blurred vision, double vision and discharge.   Respiratory: Negative for chest tightness, shortness of breath and stridor.    Cardiovascular: Negative for chest pain, palpitations and leg swelling.   Gastrointestinal: Negative for blood in stool.   Endocrine: Negative for polydipsia.   Genitourinary: Negative for hematuria.   Musculoskeletal: Positive for arthralgias.   Skin: Negative for color change.   Allergic/Immunologic: Negative for immunocompromised state.   Neurological: Negative for confusion.   Psychiatric/Behavioral: Negative for self-injury.       I have reviewed the ROS and confirm that it's accurate today.    Physical Exam:  Vital Signs:  Weight: 114 kg (251 lb 8 oz)   Body mass index is 39.39 kg/m².  Temp: 98 °F (36.7 °C)   Heart Rate: 67   BP: 122/68     Physical Exam  Vitals reviewed.   Constitutional:       Appearance: She is well-developed.   HENT:      Head: Normocephalic and atraumatic.      Comments: Mild alopecia     Nose:      Comments: mask  Eyes:      Conjunctiva/sclera: Conjunctivae normal.      Pupils: Pupils are equal, round, and reactive to light.   Neck:      Thyroid: No thyromegaly.      Vascular: No carotid bruit.      Trachea: No tracheal deviation.   Cardiovascular:      Rate and Rhythm: Normal rate and regular rhythm.      Heart sounds: Normal heart sounds.   Pulmonary:      Effort: Pulmonary effort is normal. No respiratory distress.      Breath sounds: Normal breath sounds.   Abdominal:      General: There is no distension.      Palpations: Abdomen is soft.      Tenderness: There is no abdominal tenderness.      Comments: Laparoscopy scars, old port site left mid abdomen   Musculoskeletal:         General: No deformity. Normal range of motion.      Cervical back: Normal range of motion and neck supple.   Skin:     General:  Skin is warm and dry.      Findings: No rash.   Neurological:      Mental Status: She is alert and oriented to person, place, and time.      Cranial Nerves: No cranial nerve deficit.      Coordination: Coordination normal.   Psychiatric:         Behavior: Behavior normal.         Thought Content: Thought content normal.         Judgment: Judgment normal.         Patient Active Problem List   Diagnosis   • Fatigue   • Heartburn   • Hypertension   • Polycystic ovarian syndrome   • Depression   • Dyspepsia   • Dyspnea on exertion   • Menstrual irregularity   • Iron deficiency   • Poor iron absorption   • Heart murmur   • Anxiety   • Lower extremity edema   • Prediabetes       Assessment:    Lindsey Sprague is a 41 y.o. year old female with medically complicated obesity.    Revisional metabolic and bariatric surgery is deemed medically necessary given the following obesity related comorbidities including anxiety and depression, dyspnea on exertion, dyslipidemia, fatigue, heart murmur, hypertension, iron deficiency anemia, joint pain, peripheral edema menstrual irregularities, PCOS, prediabetes with current Weight: 114 kg (251 lb 8 oz) and Body mass index is 39.39 kg/m²..    We had a long discussion regarding the risk, benefits, alternative therapies to revisional metabolic bariatric surgical options.  I used flip charts and Internet diagrams.  We discussed Maxi-en-Y gastric bypass versus modified duodenal switch/sips versus formal duodenal switch.  I went over potential short and long-term complications, the need for lifelong vitamin mineral supplementation and follow-up, etc.      Plan: After discussion of the risk, benefits, term therapies as above she wishes to proceed with laparoscopic possible robotic assisted modified duodenal switch/sips and EGD.  Will submit for insurance and prior to scheduling surgery have her attend informed consent class and see me back in the office.    Thank you ADEBAYO Mann  for the opportunity to reevaluate Mrs. Sprague.        Karthik Campuzano MD              Answers for HPI/ROS submitted by the patient on 10/4/2021  Please describe your symptoms.: F/u  Have you had these symptoms before?: Yes  How long have you been having these symptoms?: 1-4 days  Please list any medications you are currently taking for this condition.: None  Please describe any probable cause for these symptoms. : None  What is the primary reason for your visit?: Other

## 2021-10-26 DIAGNOSIS — R60.0 LOWER EXTREMITY EDEMA: ICD-10-CM

## 2021-10-26 DIAGNOSIS — R01.1 HEART MURMUR: ICD-10-CM

## 2021-10-26 DIAGNOSIS — K90.89 POOR IRON ABSORPTION: ICD-10-CM

## 2021-10-26 DIAGNOSIS — E28.2 POLYCYSTIC OVARIAN SYNDROME: ICD-10-CM

## 2021-10-26 DIAGNOSIS — F41.9 ANXIETY: ICD-10-CM

## 2021-10-26 DIAGNOSIS — I10 PRIMARY HYPERTENSION: Primary | ICD-10-CM

## 2021-10-26 DIAGNOSIS — R73.03 PREDIABETES: ICD-10-CM

## 2021-10-26 DIAGNOSIS — R12 HEARTBURN: ICD-10-CM

## 2021-10-26 DIAGNOSIS — E61.1 IRON DEFICIENCY: ICD-10-CM

## 2021-10-26 DIAGNOSIS — R53.83 FATIGUE, UNSPECIFIED TYPE: ICD-10-CM

## 2021-10-28 ENCOUNTER — DOCUMENTATION (OUTPATIENT)
Dept: BARIATRICS/WEIGHT MGMT | Facility: CLINIC | Age: 41
End: 2021-10-28

## 2021-10-28 ENCOUNTER — OFFICE VISIT (OUTPATIENT)
Dept: BEHAVIORAL HEALTH | Facility: CLINIC | Age: 41
End: 2021-10-28

## 2021-10-28 DIAGNOSIS — F32.A MILD DEPRESSION: ICD-10-CM

## 2021-10-28 DIAGNOSIS — F90.0 ATTENTION DEFICIT HYPERACTIVITY DISORDER (ADHD), PREDOMINANTLY INATTENTIVE TYPE: ICD-10-CM

## 2021-10-28 DIAGNOSIS — Z71.89 ENCOUNTER FOR PSYCHOLOGICAL ASSESSMENT PRIOR TO BARIATRIC SURGERY: ICD-10-CM

## 2021-10-28 DIAGNOSIS — F41.9 MILD ANXIETY: ICD-10-CM

## 2021-10-28 DIAGNOSIS — E66.9 OBESITY (BMI 30-39.9): Primary | ICD-10-CM

## 2021-10-28 PROCEDURE — 90791 PSYCH DIAGNOSTIC EVALUATION: CPT | Performed by: PSYCHOLOGIST

## 2021-10-28 NOTE — PROGRESS NOTES
PROGRESS NOTE    Data:    Lindsey Sprague is a 41 y.o. female who met with the undersigned for a scheduled individual outpatient therapy session from 11:16am - 12:00pm.      Clinical Maneuvering/Intervention:      Chief complaint and history of presenting illness/Problems: struggling with obesity for several years. Despite trying different weight loss plans and diets, the pt reported being unsuccessful in losing weight. A psychological evaluation was conducted in order to assess past and current level of functioning. Areas assessed included, but were not limited to: perception of social support, perception of ability to face and deal with challenges in life (positive functioning), anxiety symptoms, depressive symptoms, perspective on beliefs/belief system, coping skills for stress, intelligence level, addiction issues, etc. Therapeutic rapport was established. Interventions conducted today were geared towards assessing the pt's readiness for weight loss surgery and identifying and psychological contraindications for undergoing such a major life change. Social support was deemed strong (specific to weight loss surgery/weight loss in this manner and in a general sense): , children, other family members, and friends. 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  Current psychological struggles were described as low to moderate, including mild depression, mild anxiety, and ADD. She talked about how she often struggles with concentration, but not with hyperactivity/fidgeting. Coping skills for distress and related to undergoing a major life change such as weight loss surgery/weight loss were deemed strong and included intelligence, relying on social support, using resources to meet her needs (weight loss surgery, counseling, etc), and believing in herself that she can face/overcome challenges in life. She also tends to easily find gratitude in life and humor  in life. The pt endorsed having characteristics of readiness to undergo major life changes inherent in the journey of weight loss surgery. She could speak to having 'suffered enough,' and the decision to have a third weight loss surgery makes sense/clicks for her. The pt expressed gratitude for today's visit.     Past Family and Social History:      History of family mental health problems: none identified    Psychosocial history: treatment of psychiatric care in the past (regular individual counseling sessions for several years), alcohol/substance abuse treatment in the past (N/A) , alcohol/substance abuse problems (N/A), inpatient psychiatric care (N/A).    Mental Status Exam (MSE):  Hygiene:  good  Dress: normal  Attitude:  cooperative and proactive  Motor Activity: normal  Speech: normal  Mood:     Affect:  congruent  Thought Processes: normal  Thought Content:  normal  Suicidal Thoughts:  not endorsed  Homicidal Thoughts:  not endorsed  Crisis Safety Plan: not needed   Hallucinations:  none      Patient's Support Network Includes:  family, friends      Progress toward goal: there is evidence to suggest that she is taking measures to improve the quality of her life including seeking weight loss surgery.       Functional Status: moderate to high      Prognosis: good with weight loss surgery    Evaluation, Diagnoses, and Ability/Capacity to Respond to Treatment:      The pt presented to be struggling with mild depression, mild anxiety, ADD, and obesity. Results of MSE demonstrated a functional status of moderate to high. Strengths: belief in self that she will be successful with weight loss surgery, etc (see detailed list of coping skills above). Needed for growth (CPT code requirement for Weaknesses): weight loss.      From a psychological standpoint, the pt presents as a good candidate for bariatric surgery. She is motivated for the surgery, has showed readiness for the lifestyle change in terms of starting to  adjust her eating habits, and seems to have appropriate expectations of how to prepare and how to live after surgery in order to lose weight successfully.    Treatment Plan:      Short term goals: Continued medication for anxiety, depression, and ADD. Start improving her health by following up with her bariatric surgeon in order to receive weight loss surgery as soon as feasible/appropriate and demonstrate success with compliance to adhering to the recommended diet. Long term goals: reach a healthy weight and continued medication for anxiety, depression, and ADD    Crystal Vaz, PhD, LP

## 2021-10-28 NOTE — PROGRESS NOTES
"Weight Loss Surgery  Presurgical Nutrition Assessment     Lindsey Sprague  10/28/2021  [id number]  [id number]  1980  female    Surgery desired: SIPS    Ht 170.2 cm (67\"); Wt 114 kg (251.5 #); BMI 39.39  Past Medical History:   Diagnosis Date   • Anxiety    • Depression    • Dyspepsia     serum h. pyl neg 2017   • Dyspnea on exertion    • Elevated HDL     81   • Elevated LDL cholesterol level     102   • Fatigue    • Heart murmur     asx   • Heartburn     denies further issue s/p LSG - poss recurrent HH on EGD 2017 [facility]   • Hypertension    • Iron deficiency     s/p IV iron 10/2018   • Joint pain     not taking meds   • Lower extremity edema    • Menstrual irregularity    • Morbid obesity (HCC)    •  (normal spontaneous vaginal delivery)     x2, preeclampsia requiring early delivery   • Polycystic ovarian syndrome    • Prediabetes    • Wears eyeglasses      Past Surgical History:   Procedure Laterality Date   • ENDOSCOPY N/A 2017    Procedure: ESOPHAGOGASTRODUODENOSCOPY;  Surgeon: Karthik Campuzano MD;  Location:  [facility];  Service:    • ENDOSCOPY  10/22/2018   • GASTRIC BANDING REMOVAL      s/p AGBR 17 by [facility] for chronic pain/nausea/pouch enlargement   • GASTRIC SLEEVE LAPAROSCOPIC N/A 2017    Procedure: GASTRIC SLEEVE LAPAROSCOPIC;  Surgeon: Karthik Campuzano MD;  Location:  [facility];  Service:    • LAPAROSCOPIC CHOLECYSTECTOMY  10/22/2018   • LAPAROSCOPIC GASTRIC BANDING      s/p LAGB APS w/ HHR 3/2011 by [facility]   • TONSILLECTOMY       No Known Allergies    Current Outpatient Medications:   •  busPIRone (BUSPAR) 15 MG tablet, Take 15 mg by mouth 2 (two) times a day., Disp: , Rfl:   •  hydroCHLOROthiazide (HYDRODIURIL) 25 MG tablet, Take 25 mg by mouth Daily., Disp: , Rfl:   •  hydrOXYzine (VISTARIL) 25 MG capsule, 25 mg As Needed., Disp: , Rfl:   •  melatonin 5 MG tablet tablet, Take 5 mg by mouth Every Night., Disp: , Rfl:   •  traZODone (DESYREL) 100 MG tablet, Take 100 " mg by mouth Every Night., Disp: , Rfl:   •  Viibryd 40 MG tablet tablet, Take 40 mg by mouth Daily., Disp: , Rfl:       Nutrition Assessment    Estimated energy needs:  1835 kcal    Estimated calories for weight loss:  1500 kcal    IBW (Pounds):  160 #        Excess body weight (Pounds):  90 #       Nutrition Recall  24 Hour recall: (B) (L) (D) -  Reviewed and discussed with patient.  Pt drinks no soda or sweet tea, but states that sweet foods are a weakness of hers.  Eats excessively in the evenings.  No food at breakfast.  At 7 am drinks one cup coffee /c 2 creamers & a measured amount of cinnamon.  Lunch @ 11:30 am = 1/2 antipasto salad /c cheese, sausage, ham mushrooms, small pepperonis, peppers & onion /c 2 small cheese sticks.  Dinner @ 6 pm = 4 pieces naan, 1/2 apple, 2 tbsp crunchy peanut butter & 1/2 cup chocolate covered almonds. Eats ice cream especially, and also cookies, etc, in the evenings. Diet has few fruits & vegetables & is low in protein.  Pt to focus on ingesting adequate protein for successful weight management in 3 regular balanced meals + 2 to 3 snacks per day.        Exercise  never      Education    Provided information packet re:  SIPS manual  1. Reviewed guidelines for higher protein, limited carbohydrate diet to promote weight loss.  Encouraged patient to incorporate these principles of healthy eating from now until approximately 2 weeks prior to bariatric surgery date, when an even lower carbohydrate “liver-shrinking” regimen will be followed. (Information sheet re pre-op diet given).  Explained that after recovery from surgery this diet will again be followed to ensure further loss and for weight maintenance.    2. Encouraged patient to choose an acceptable protein supplement powder or shake for post-surgery liquid diet.  Provided product guidelines and examples.    3. Explained importance of goal setting to help in changing eating behaviors that are not conducive to weight loss.   Targeted several on a worksheet which also included spaces for patient to work on issues specific to them.  4. Provided follow-up options for support, including contact information for dietitians here, if desired.  Web-based support information and apps for smart phones and computers given.  Noted that monthly support group is offered at this clinic, and that support is associated with successful weight loss.    Recommend that team proceed with surgery and follow per protocol.      Nutrition Goals   Dietary Guidelines per information packet as described above  Protein goal:  grams per day   Carbohydrate goal:  100-140 grams per day  Eliminate soda, sweet tea, etc.     Exercise Goals  Continue current exercise routine   Add 15-30 minutes of activity per day as tolerated      Maia Solo, KIM  10/28/2021  12:56 EDT

## 2021-10-29 LAB
ALBUMIN SERPL-MCNC: 4.7 G/DL (ref 3.5–5.2)
ALBUMIN/GLOB SERPL: 1.9 G/DL
ALP SERPL-CCNC: 75 U/L (ref 39–117)
ALT SERPL-CCNC: 13 U/L (ref 1–33)
AST SERPL-CCNC: 17 U/L (ref 1–32)
BASOPHILS # BLD AUTO: 0.05 10*3/MM3 (ref 0–0.2)
BASOPHILS NFR BLD AUTO: 0.6 % (ref 0–1.5)
BILIRUB SERPL-MCNC: 0.5 MG/DL (ref 0–1.2)
BUN SERPL-MCNC: 12 MG/DL (ref 6–20)
BUN/CREAT SERPL: 12.5 (ref 7–25)
CALCIUM SERPL-MCNC: 9.8 MG/DL (ref 8.6–10.5)
CHLORIDE SERPL-SCNC: 98 MMOL/L (ref 98–107)
CHOLEST SERPL-MCNC: 166 MG/DL (ref 0–200)
CO2 SERPL-SCNC: 32 MMOL/L (ref 22–29)
CREAT SERPL-MCNC: 0.96 MG/DL (ref 0.57–1)
EOSINOPHIL # BLD AUTO: 0.06 10*3/MM3 (ref 0–0.4)
EOSINOPHIL NFR BLD AUTO: 0.7 % (ref 0.3–6.2)
ERYTHROCYTE [DISTWIDTH] IN BLOOD BY AUTOMATED COUNT: 13.2 % (ref 12.3–15.4)
GLOBULIN SER CALC-MCNC: 2.5 GM/DL
GLUCOSE SERPL-MCNC: 114 MG/DL (ref 65–99)
HBA1C MFR BLD: 5.3 % (ref 4.8–5.6)
HCT VFR BLD AUTO: 48.2 % (ref 34–46.6)
HDLC SERPL-MCNC: 71 MG/DL (ref 40–60)
HGB BLD-MCNC: 15.6 G/DL (ref 12–15.9)
IMM GRANULOCYTES # BLD AUTO: 0.02 10*3/MM3 (ref 0–0.05)
IMM GRANULOCYTES NFR BLD AUTO: 0.2 % (ref 0–0.5)
LDLC SERPL CALC-MCNC: 82 MG/DL (ref 0–100)
LYMPHOCYTES # BLD AUTO: 2.45 10*3/MM3 (ref 0.7–3.1)
LYMPHOCYTES NFR BLD AUTO: 27.5 % (ref 19.6–45.3)
MCH RBC QN AUTO: 27.5 PG (ref 26.6–33)
MCHC RBC AUTO-ENTMCNC: 32.4 G/DL (ref 31.5–35.7)
MCV RBC AUTO: 85 FL (ref 79–97)
MONOCYTES # BLD AUTO: 0.57 10*3/MM3 (ref 0.1–0.9)
MONOCYTES NFR BLD AUTO: 6.4 % (ref 5–12)
NEUTROPHILS # BLD AUTO: 5.76 10*3/MM3 (ref 1.7–7)
NEUTROPHILS NFR BLD AUTO: 64.6 % (ref 42.7–76)
NRBC BLD AUTO-RTO: 0 /100 WBC (ref 0–0.2)
PLATELET # BLD AUTO: 310 10*3/MM3 (ref 140–450)
POTASSIUM SERPL-SCNC: 3.7 MMOL/L (ref 3.5–5.2)
PROT SERPL-MCNC: 7.2 G/DL (ref 6–8.5)
RBC # BLD AUTO: 5.67 10*6/MM3 (ref 3.77–5.28)
SODIUM SERPL-SCNC: 138 MMOL/L (ref 136–145)
TRIGL SERPL-MCNC: 65 MG/DL (ref 0–150)
TSH SERPL DL<=0.005 MIU/L-ACNC: 1.2 UIU/ML (ref 0.27–4.2)
UREA BREATH TEST QL: NEGATIVE
VLDLC SERPL CALC-MCNC: 13 MG/DL (ref 5–40)
WBC # BLD AUTO: 8.91 10*3/MM3 (ref 3.4–10.8)

## (undated) DEVICE — ENDOPATH XCEL BLADELESS TROCARS WITH STABILITY SLEEVES: Brand: ENDOPATH XCEL

## (undated) DEVICE — SYS CLS PORTSITE CT CLOSESURE 5AND10/12

## (undated) DEVICE — [HIGH FLOW INSUFFLATOR,  DO NOT USE IF PACKAGE IS DAMAGED,  KEEP DRY,  KEEP AWAY FROM SUNLIGHT,  PROTECT FROM HEAT AND RADIOACTIVE SOURCES.]: Brand: PNEUMOSURE

## (undated) DEVICE — GOWN,PREVENTION PLUS,XXLARGE,STERILE: Brand: MEDLINE

## (undated) DEVICE — SUT MONOCRYL PLS ANTIB UND 3/0  PS1 27IN

## (undated) DEVICE — PK BARIATRIC 10

## (undated) DEVICE — GLV SURG SENSICARE MICRO PF LF 6.5 STRL

## (undated) DEVICE — GLV SURG SENSICARE W/ALOE PF LF SZ6 STRL

## (undated) DEVICE — ENDOPATH XCEL UNIVERSAL TROCAR STABLILITY SLEEVES: Brand: ENDOPATH XCEL

## (undated) DEVICE — ECHELON FLEX POWERED PLUS LONG ARTICULATING ENDOSCOPIC LINEAR CUTTER, 60MM: Brand: ECHELON FLEX

## (undated) DEVICE — GLV SURG TRIUMPH ORTHO W/ALOE PF LTX 8.5 STRL

## (undated) DEVICE — FLTR PLUMEPORT LAP W/CONN STRL

## (undated) DEVICE — APPL HEMOS FOR DELIVERY FLOSEAL

## (undated) DEVICE — APL DUPLOSPRAYER MIS 40CM

## (undated) DEVICE — 50" SINGLE PATIENT USE HOVERMATT: Brand: SINGLE PATIENT USE HOVERMATT

## (undated) DEVICE — HARMONIC HD 1000I SHEARS, 36CM SHAFT LENGTH: Brand: HARMONIC

## (undated) DEVICE — ENCORE® LATEX MICRO SIZE 6.5, STERILE LATEX POWDER-FREE SURGICAL GLOVE: Brand: ENCORE

## (undated) DEVICE — SKIN AFFIX SURG ADHESIVE 72/CS 0.55ML: Brand: MEDLINE

## (undated) DEVICE — GLV SURG TRIUMPH ORTHO W/ALOE PF LTX 9 STRL

## (undated) DEVICE — TROCAR: Brand: KII FIOS FIRST ENTRY

## (undated) DEVICE — GOWN,NON-REINFORCED,SIRUS,SET IN SLV,XL: Brand: MEDLINE

## (undated) DEVICE — TISSUE RETRIEVAL SYSTEM: Brand: INZII RETRIEVAL SYSTEM